# Patient Record
Sex: MALE | Race: WHITE | URBAN - METROPOLITAN AREA
[De-identification: names, ages, dates, MRNs, and addresses within clinical notes are randomized per-mention and may not be internally consistent; named-entity substitution may affect disease eponyms.]

---

## 2019-12-16 PROBLEM — K42.9 UMBILICAL HERNIA WITHOUT OBSTRUCTION AND WITHOUT GANGRENE: Status: ACTIVE | Noted: 2019-12-16

## 2021-01-22 ENCOUNTER — NURSE ONLY (OUTPATIENT)
Dept: PRIMARY CARE CLINIC | Age: 41
End: 2021-01-22

## 2021-01-22 DIAGNOSIS — Z11.1 VISIT FOR MANTOUX TEST: Primary | ICD-10-CM

## 2021-01-22 PROCEDURE — 86580 TB INTRADERMAL TEST: CPT | Performed by: PHYSICIAN ASSISTANT

## 2021-01-24 ENCOUNTER — NURSE ONLY (OUTPATIENT)
Dept: PRIMARY CARE CLINIC | Age: 41
End: 2021-01-24

## 2021-01-24 DIAGNOSIS — Z11.1 ENCOUNTER FOR PPD SKIN TEST READING: Primary | ICD-10-CM

## 2021-01-24 NOTE — PROGRESS NOTES
PPD Reading Note  PPD read and results entered in [employer]. Result: 0 mm induration.   Interpretation: negative  If test not read within 48-72 hours of initial placement, patient advised to repeat in other arm 1-3 weeks after this test.  Allergic reaction: no

## 2021-10-13 ENCOUNTER — HOSPITAL ENCOUNTER (OUTPATIENT)
Age: 41
Setting detail: SPECIMEN
Discharge: HOME | End: 2021-10-13
Payer: OTHER GOVERNMENT

## 2021-10-13 DIAGNOSIS — Z00.00 WELL ADULT EXAM: ICD-10-CM

## 2021-10-13 LAB
ALBUMIN SERPL-MCNC: 4.2 G/DL (ref 3.5–5.2)
ALBUMIN/GLOBULIN RATIO: 1.8 (ref 1–2.5)
ALP BLD-CCNC: 73 U/L (ref 40–129)
ALT SERPL-CCNC: 29 U/L (ref 5–41)
ANION GAP SERPL CALCULATED.3IONS-SCNC: 14 MMOL/L (ref 9–17)
AST SERPL-CCNC: 18 U/L
BILIRUB SERPL-MCNC: 0.48 MG/DL (ref 0.3–1.2)
BUN BLDV-MCNC: 18 MG/DL (ref 6–20)
BUN/CREAT BLD: NORMAL (ref 9–20)
CALCIUM SERPL-MCNC: 9.1 MG/DL (ref 8.6–10.4)
CHLORIDE BLD-SCNC: 105 MMOL/L (ref 98–107)
CHOLESTEROL/HDL RATIO: 5.1
CHOLESTEROL: 194 MG/DL
CO2: 22 MMOL/L (ref 20–31)
CREAT SERPL-MCNC: 0.81 MG/DL (ref 0.7–1.2)
GFR AFRICAN AMERICAN: >60 ML/MIN
GFR NON-AFRICAN AMERICAN: >60 ML/MIN
GFR SERPL CREATININE-BSD FRML MDRD: NORMAL ML/MIN/{1.73_M2}
GFR SERPL CREATININE-BSD FRML MDRD: NORMAL ML/MIN/{1.73_M2}
GLUCOSE FASTING: 89 MG/DL (ref 70–99)
HCT VFR BLD CALC: 45.5 % (ref 40.7–50.3)
HDLC SERPL-MCNC: 38 MG/DL
HEMOGLOBIN: 14.6 G/DL (ref 13–17)
LDL CHOLESTEROL: 123 MG/DL (ref 0–130)
MCH RBC QN AUTO: 27.8 PG (ref 25.2–33.5)
MCHC RBC AUTO-ENTMCNC: 32.1 G/DL (ref 28.4–34.8)
MCV RBC AUTO: 86.5 FL (ref 82.6–102.9)
NRBC AUTOMATED: 0 PER 100 WBC
PDW BLD-RTO: 12.9 % (ref 11.8–14.4)
PLATELET # BLD: 173 K/UL (ref 138–453)
PMV BLD AUTO: 10.3 FL (ref 8.1–13.5)
POTASSIUM SERPL-SCNC: 4.2 MMOL/L (ref 3.7–5.3)
RBC # BLD: 5.26 M/UL (ref 4.21–5.77)
SODIUM BLD-SCNC: 141 MMOL/L (ref 135–144)
TOTAL PROTEIN: 6.5 G/DL (ref 6.4–8.3)
TRIGL SERPL-MCNC: 163 MG/DL
TSH SERPL DL<=0.05 MIU/L-ACNC: 2.77 MIU/L (ref 0.3–5)
VITAMIN B-12: 945 PG/ML (ref 232–1245)
VITAMIN D 25-HYDROXY: 29.6 NG/ML (ref 30–100)
VLDLC SERPL CALC-MCNC: ABNORMAL MG/DL (ref 1–30)
WBC # BLD: 6.6 K/UL (ref 3.5–11.3)

## 2021-10-15 LAB — T4 TOTAL: 8 UG/DL (ref 4.5–10.9)

## 2022-10-27 ENCOUNTER — INITIAL CONSULT (OUTPATIENT)
Dept: PHYSICAL MEDICINE AND REHAB | Age: 42
End: 2022-10-27
Payer: OTHER GOVERNMENT

## 2022-10-27 VITALS
HEART RATE: 94 BPM | DIASTOLIC BLOOD PRESSURE: 99 MMHG | HEIGHT: 63 IN | SYSTOLIC BLOOD PRESSURE: 145 MMHG | BODY MASS INDEX: 37.11 KG/M2 | WEIGHT: 209.44 LBS | TEMPERATURE: 98.8 F

## 2022-10-27 DIAGNOSIS — M25.512 ACUTE PAIN OF LEFT SHOULDER: Primary | ICD-10-CM

## 2022-10-27 DIAGNOSIS — G89.29 CHRONIC PAIN OF SCAPULA: ICD-10-CM

## 2022-10-27 DIAGNOSIS — M25.512 TRIGGER POINT OF LEFT SHOULDER REGION: ICD-10-CM

## 2022-10-27 DIAGNOSIS — M89.8X1 CHRONIC PAIN OF SCAPULA: ICD-10-CM

## 2022-10-27 PROCEDURE — 99243 OFF/OP CNSLTJ NEW/EST LOW 30: CPT | Performed by: PHYSICAL MEDICINE & REHABILITATION

## 2022-10-27 RX ORDER — MELOXICAM 15 MG/1
TABLET ORAL
COMMUNITY
Start: 2022-09-20

## 2022-10-27 RX ORDER — CYCLOBENZAPRINE HCL 10 MG
TABLET ORAL
COMMUNITY
Start: 2022-09-20 | End: 2022-11-14 | Stop reason: DRUGHIGH

## 2022-10-27 NOTE — PROGRESS NOTES
[address] [facility] PHYSICAL MEDICINE AND REHABILITATION  [facility] [place] [address]  Dept: [phone]  Dept Fax: [phone]    New Patient Janette Chavis, 38 y.o., male, is c/o of Shoulder Pain (Left and pain across the mid back) and Neck Pain   and request for evaluation of by Deonte Del Rio MD.    HPI:      HPI  82-year-old right-handed male works for the [employer]. 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 He was poorly off multiple trays and he felt a pop in his mid upper back. He was sent to his PCP. He has had x-rays and MRIs of both the cervical and thoracic spine as well as physical therapy. He has seen Dr. Mati Ngo of neurosurgery. Notes that MRI of the cervical and thoracic spine were normal.  He has had physical therapy and has question some referred pain and shoulder discomfort. As result patient has been referred for further evaluation. He notes weakness on arm extension with any weights. Particularly on the left with discomfort in the mid scapular region greater on the left. He question some burning sensation in the area. Symptoms are better with rest and ice. Physical therapy initially helped however is not sure is making a much more improvement he stopped in mid August.. He notes occasional popping sensation left shoulder as well. He denies any numbness or tingling in the upper extremities. He does note occasional difficulty holding onto things but no nocturnal seizures. He questions possible weakness in the shoulder as well. He has tried meloxicam and Flexeril. Uses Flexeril mostly at nighttime which appears to help. Pain goes from 7-8 out of 10 down to 2-3 out of 10. Per Dr. Mati Ngo reviewed MRI cervical spine does not show any evidence of abnormality that explain his symptoms.   Despite that she looks quite healthy. This is consistent with the findings on thoracic MRI as well.,  He reviewed cervical flexion-extension spine films which did not show any abnormalities. Isreal Alvarez He reviewed shoulder x-ray that was ordered by another provider the left shoulder was read as showing evidence of degenerative changes. X-ray left shoulder 7/28/2022 degenerative changes without acute ossific abnormality    MRI cervical spine 8/9/2022-cervical vertebral body and disc space heights and alignment are not and signal are normal, craniocervical junction is normal, signal in the cervical spinal cord is normal, no disc herniation or spinal stenosis seen, no acute abnormality    No past medical history on file. Past Surgical History:   Procedure Laterality Date    TONSILLECTOMY AND ADENOIDECTOMY         No family history on file. Social History     Tobacco Use    Smoking status: Never    Smokeless tobacco: Never   Substance Use Topics    Alcohol use: Yes        Current Outpatient Medications   Medication Sig Dispense Refill    meloxicam (MOBIC) 15 MG tablet TAKE 1 TABLET BY MOUTH EVERY DAY      cyclobenzaprine (FLEXERIL) 10 MG tablet TAKE 1 TABLET BY MOUTH THREE TIMES DAILY FOR 14 DAYS      montelukast (SINGULAIR) 10 MG tablet TAKE 1 TABLET BY MOUTH DAILY 90 tablet 0    Multiple Vitamins-Minerals (MULTIVITAMIN ADULT PO) Take by mouth      Fexofenadine HCl (ALLEGRA ALLERGY PO) Take by mouth       No current facility-administered medications for this visit. Allergies   Allergen Reactions    Seasonal      Other reaction(s): Burning sensation of nose         Subjective:      Review of Systems  CONSTITUTIONAL: Negativefor fever, chills, sweat, fatigue and unexpected weight change.    HEENT:  Negative for diplopia, blindspots, blurred vision, hearing loss, trouble chewing or swallowing, denies coughingwhile eating or drinking denies nosebleed    RESPIRATORY: Negativefor wheezing, coughing or shortness of breath CARDIOVASCULAR: Negative for chest pain, palpitations,lightheadedness  GASTROINTESTINAL: Negative for heartburn, nausea, constipation,diarrhea, abdominal pain  or incontinence  GENTIOURNIARY: Negative for dysuria, urgency, frequency, incontinence and difficultyurinating. ENDOCRINE: Negative forhot or cold intolerance, denies any significant weight change  MUSCULOSKELETAL:Negative for focal joint pain, back pain, neck pain and arthralgias. NUEROLOGIIC: Negativefor focal weakness, numbness, tingling, balance loss, headaches or falls  BEHAVIOR/PSYCY:Denies depression, anxiety, memory loss or insomnia  SKIN: Denies ulcers, rashesor bruises     Objective:     Physical Exam   BP (!) 145/99   Pulse 94   Temp 98.8 °F (37.1 °C)   Ht 5' 3\" (1.6 m)   Wt 209 lb 7 oz (95 kg)   BMI 37.10 kg/m²     Nursing notes and vitals reviewed    CONSTITUTIONAL: He isoriented to person, place, and time. He appears well-developed and well-nourished. HEENT: Pupils equal reactiveto light, exact motion intact, visual fields are full, no facial asymmetry, speechfluent, no dysarthria, comprehension intact, object naming intact, repetition intact,basic cognition intact  CARDIOVASCULAR:Heart regular rate and rhythm with no murmurs rubs or gallops   PULMONARY/CHEST: Clear to auscultation with no wheezes or crackles noted  ABDOMEN: Soft, nontender withpositive bowel sounds, no guarding or rebound   NEUROLOGIC:    Orientation: Alert and oriented×3,    cranial nerves:  II through XII are intact,   Strength:5 out of 5 throughout bilateral upper and lower extremities including shoulder flexion, extension, abduction, adduction, internal and external rotation. There is question mild tenderness of the lateral shoulder. Drop arm test is negative.   There is no sulcus sign, there is no atrophy there is no scapular winging   Sensation:Intact to light touch and pinprick except questionable decreased left thumb   Balance: Intact, Romberg's is negative   Deep tendon reflexes: Bilaterally equal and symmetric, negative Babinski, negativeHoffmann's   Provocative maneuvers:  EXTREMITIES: No calf tenderness, negative Homans, negative warmth, pulses are intact  SKIN: Skin is warm and dry. PSYCIATIRIC: normal mood and affect, behavior is normal. Thought content normal.  Positive trigger point left trapezius and left mid thoracic region which reproduces his pain on palpation with radiation into the scapula and shoulder region. Assessment:       Diagnosis Orders   1. Acute pain of left shoulder  CBC    Comprehensive Metabolic Panel      2. Trigger point of left shoulder region  CBC    Comprehensive Metabolic Panel      3. Chronic pain of scapula               Plan:      29-year-old male working in [employer] has discomfort at mid left thoracic area with radiation into the mid scapular and left shoulder after an injury while moving trays. Also notes some clicking in the left shoulder. He has seen Dr. Nora Anderson of neurosurgery-cervical and thoracic MRI is unremarkable, flexion-extension cervical spine unremarkable per Dr. Nora Anderson. X-ray of the left shoulder show some degenerative changes. 1.  Examination notes positive trigger points left upper trapezius and mid thoracic which reproduces symptoms. Would like to do trigger point injections as I suspect this is more musculoskeletal and these trigger points reproduces his symptoms. He notes we need to get approval.  After trigger point injections will follow up with stretching, massage, modalities. , etc with physical therapy  2. He continues on meloxicam and Flexeril-we will get lab work CBC and CMP to make sure he tolerated this well. Noted blood pressure has been mildly elevated. He is to follow with his PCP  3. If symptoms persist despite trigger point injections and therapy,  will then consider EMG left upper extremity and MRI of the left shoulder  4.   Above was discussed at length with patient -he is agreement with the plan. Orders Placed This Encounter   Procedures    CBC     Standing Status:   Future     Standing Expiration Date:   10/27/2023    Comprehensive Metabolic Panel     Standing Status:   Future     Standing Expiration Date:   10/27/2023     No orders of the defined types were placed in this encounter. No follow-ups on file. Electronically signedby Radha Carlos. Troy Holloway MD on 10/27/2022 at 5:46 PM    Please note that this chart was generated using voicerecognition Dragon dictation software. Although every effort was made to ensurethe accuracy of this automated transcription, some errors in transcription may haveoccurred.

## 2022-10-27 NOTE — LETTER
[address] [facility] PHYSICAL MEDICINE AND REHABILITATION  [address] [place] [address]  Dept: [phone]  Dept Fax: [phone]      10/27/22    Patient: Esther Leija  YOB: 1980    Dear  Ean Wilkinson MD ,    I had the pleasure of seeing your patient, Esther Leija today in the office today. Below are the relevant portions of my assessment and plan of care. IMPRESSION:   Diagnosis Orders   1. Acute pain of left shoulder  CBC    Comprehensive Metabolic Panel      2. Trigger point of left shoulder region  CBC    Comprehensive Metabolic Panel      3. 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 Also notes some clicking in the left shoulder. He has seen Dr. Cheyanne Mckinney of neurosurgery-cervical and thoracic MRI is unremarkable, flexion-extension cervical spine unremarkable per Dr. Cheyanne Mckinney. X-ray of the left shoulder show some degenerative changes. 1.  Examination notes positive trigger points left upper trapezius and mid thoracic which reproduces symptoms. Would like to do trigger point injections as I suspect this is more musculoskeletal and these trigger points reproduces his symptoms. He notes we need to get approval.  After trigger point injections will follow up with stretching, massage, modalities. , etc with physical therapy  2. He continues on meloxicam and Flexeril-we will get lab work CBC and CMP to make sure he tolerated this well. Noted blood pressure has been mildly elevated. He is to follow with his PCP  3. If symptoms persist despite trigger point injections and therapy,  will then consider EMG left upper extremity and MRI of the left shoulder  4. Above was discussed at length with patient -he is agreement with the plan.          Orders Placed This Encounter   Procedures    CBC     Standing Status:   Future     Standing Expiration Date:   10/27/2023    Comprehensive Metabolic Panel     Standing Status:   Future     Standing Expiration Date:   10/27/2023     No orders of the defined types were placed in this encounter. No follow-ups on file. Thank you for allowing me to participate in the care of this patient. I will keep you updated on this patient's follow up and I look forward to serving you and your patients again in the future. Dimas Phalen. Namrata Kumar MD

## 2022-11-14 ENCOUNTER — HOSPITAL ENCOUNTER (OUTPATIENT)
Age: 42
Discharge: HOME | End: 2022-11-14
Payer: OTHER GOVERNMENT

## 2022-11-14 ENCOUNTER — PROCEDURE VISIT (OUTPATIENT)
Dept: PHYSICAL MEDICINE AND REHAB | Age: 42
End: 2022-11-14
Payer: OTHER GOVERNMENT

## 2022-11-14 ENCOUNTER — TELEPHONE (OUTPATIENT)
Dept: PHYSICAL MEDICINE AND REHAB | Age: 42
End: 2022-11-14

## 2022-11-14 VITALS
WEIGHT: 213 LBS | HEART RATE: 81 BPM | DIASTOLIC BLOOD PRESSURE: 98 MMHG | SYSTOLIC BLOOD PRESSURE: 141 MMHG | BODY MASS INDEX: 37.73 KG/M2 | TEMPERATURE: 97.4 F

## 2022-11-14 DIAGNOSIS — M25.512 TRIGGER POINT OF LEFT SHOULDER REGION: ICD-10-CM

## 2022-11-14 DIAGNOSIS — M79.18 MYOFASCIAL PAIN ON LEFT SIDE: ICD-10-CM

## 2022-11-14 DIAGNOSIS — M25.512 ACUTE PAIN OF LEFT SHOULDER: ICD-10-CM

## 2022-11-14 DIAGNOSIS — G89.29 CHRONIC PAIN OF SCAPULA: ICD-10-CM

## 2022-11-14 DIAGNOSIS — M89.8X1 CHRONIC PAIN OF SCAPULA: ICD-10-CM

## 2022-11-14 DIAGNOSIS — M62.838 SPASM OF MUSCLE: Primary | ICD-10-CM

## 2022-11-14 LAB
ALBUMIN SERPL-MCNC: 4.5 G/DL (ref 3.5–5.2)
ALP BLD-CCNC: 85 U/L (ref 40–129)
ALT SERPL-CCNC: 41 U/L (ref 5–41)
ANION GAP SERPL CALCULATED.3IONS-SCNC: 7 MMOL/L (ref 9–17)
AST SERPL-CCNC: 22 U/L
BILIRUB SERPL-MCNC: 0.6 MG/DL (ref 0.3–1.2)
BUN BLDV-MCNC: 13 MG/DL (ref 6–20)
CALCIUM SERPL-MCNC: 9.5 MG/DL (ref 8.6–10.4)
CHLORIDE BLD-SCNC: 105 MMOL/L (ref 98–107)
CO2: 28 MMOL/L (ref 20–31)
CREAT SERPL-MCNC: 0.79 MG/DL (ref 0.7–1.2)
GFR SERPL CREATININE-BSD FRML MDRD: >60 ML/MIN/1.73M2
GLUCOSE BLD-MCNC: 91 MG/DL (ref 70–99)
HCT VFR BLD CALC: 42.5 % (ref 41–53)
HEMOGLOBIN: 14.6 G/DL (ref 13.5–17.5)
MCH RBC QN AUTO: 28 PG (ref 26–34)
MCHC RBC AUTO-ENTMCNC: 34.3 G/DL (ref 31–37)
MCV RBC AUTO: 81.6 FL (ref 80–100)
PDW BLD-RTO: 13.4 % (ref 11.5–14.9)
PLATELET # BLD: 181 K/UL (ref 150–450)
PMV BLD AUTO: 7.2 FL (ref 6–12)
POTASSIUM SERPL-SCNC: 4.2 MMOL/L (ref 3.7–5.3)
RBC # BLD: 5.21 M/UL (ref 4.5–5.9)
SODIUM BLD-SCNC: 140 MMOL/L (ref 135–144)
TOTAL PROTEIN: 7 G/DL (ref 6.4–8.3)
WBC # BLD: 6.7 K/UL (ref 3.5–11)

## 2022-11-14 PROCEDURE — 80053 COMPREHEN METABOLIC PANEL: CPT

## 2022-11-14 PROCEDURE — 36415 COLL VENOUS BLD VENIPUNCTURE: CPT

## 2022-11-14 PROCEDURE — 85027 COMPLETE CBC AUTOMATED: CPT

## 2022-11-14 PROCEDURE — 20552 NJX 1/MLT TRIGGER POINT 1/2: CPT | Performed by: PHYSICAL MEDICINE & REHABILITATION

## 2022-11-14 RX ORDER — LIDOCAINE HYDROCHLORIDE 10 MG/ML
1 INJECTION, SOLUTION INFILTRATION; PERINEURAL ONCE
Status: COMPLETED | OUTPATIENT
Start: 2022-11-14 | End: 2022-11-14

## 2022-11-14 RX ORDER — CYCLOBENZAPRINE HCL 5 MG
5 TABLET ORAL 3 TIMES DAILY
Qty: 90 TABLET | Refills: 0 | Status: CANCELLED | OUTPATIENT
Start: 2022-11-14 | End: 2022-12-14

## 2022-11-14 RX ORDER — CYCLOBENZAPRINE HCL 5 MG
5 TABLET ORAL 2 TIMES DAILY PRN
Qty: 60 TABLET | Refills: 0 | Status: SHIPPED | OUTPATIENT
Start: 2022-11-14 | End: 2022-12-14

## 2022-11-14 RX ADMIN — LIDOCAINE HYDROCHLORIDE 1 ML: 10 INJECTION, SOLUTION INFILTRATION; PERINEURAL at 10:37

## 2022-11-14 NOTE — TELEPHONE ENCOUNTER
Murguia Halina called and said he talked to the pharmacist about cutting the Flexeril 10 mg that he has at home in half. The pharmacist said it is not recommended. Blade Meade is asking for an rx to be sent to his pharmacy for the 5 mg dose you suggested at the visit today. FYI: I will send in form for auth for the physical therapy you ordered. He wants to go to [facility] since it is closer to home. Will send that referral and the approval once I get it.

## 2022-11-14 NOTE — PROGRESS NOTES
[address] [facility] PHYSICAL MEDICINE AND REHABILITATION  [address] [place] [id number]  Dept: [phone]  Dept Fax: [phone]    New Patient Valencia Reynolds, 38 y.o., male, is c/o of Injections (TPI for the cervical/traps)   and request for evaluation of by Myriam Jacinto MD.    HPI:      HPI    Since last visit he has been doing fairly well however continues with ups and downs regards to the left posterior shoulder and scapular discomfort. Notes some discomfort on deep breathing at times. He has not obtained his lab work he plans to get that today. He takes Flexeril 10 mg occasionally at nighttime. He is noted to have mildly high blood pressure he is following up with his PCP. He returns today for trigger point injections after approval.  Denies any significant change in his pain pattern. 6/22/2022 MRI thoracic spine  IMPRESSION:  Normal plain MRI examination of the thoracic spine. 4/19/2022 x-ray thoracic spine\  IMPRESSION:     No acute osseous abnormality of the thoracic spine. 7/27/2022 x-ray cervical spine  IMPRESSION:   1. Normal alignment of the cervical spine without subluxation on the flexion and extension views. History  65-year-old right-handed male works for the [employer]. 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 He was poorly off multiple trays and he felt a pop in his mid upper back. He was sent to his PCP. He has had x-rays and MRIs of both the cervical and thoracic spine as well as physical therapy. He has seen Dr. Quincy Hunter of neurosurgery. Notes that MRI of the cervical and thoracic spine were normal.  He has had physical therapy and has question some referred pain and shoulder discomfort. As result patient has been referred for further evaluation.     He notes weakness on arm extension with any weights. Particularly on the left with discomfort in the mid scapular region greater on the left. He question some burning sensation in the area. Symptoms are better with rest and ice. Physical therapy initially helped however is not sure is making a much more improvement he stopped in mid August.. He notes occasional popping sensation left shoulder as well. He denies any numbness or tingling in the upper extremities. He does note occasional difficulty holding onto things but no nocturnal seizures. He questions possible weakness in the shoulder as well. He has tried meloxicam and Flexeril. Uses Flexeril mostly at nighttime which appears to help. Pain goes from 7-8 out of 10 down to 2-3 out of 10. Per Dr. Magdalena Velasquez reviewed MRI cervical spine does not show any evidence of abnormality that explain his symptoms. Despite that she looks quite healthy. This is consistent with the findings on thoracic MRI as well.,  He reviewed cervical flexion-extension spine films which did not show any abnormalities. Moe Goetz He reviewed shoulder x-ray that was ordered by another provider the left shoulder was read as showing evidence of degenerative changes. X-ray left shoulder 7/28/2022 degenerative changes without acute ossific abnormality    MRI cervical spine 8/9/2022-cervical vertebral body and disc space heights and alignment are not and signal are normal, craniocervical junction is normal, signal in the cervical spinal cord is normal, no disc herniation or spinal stenosis seen, no acute abnormality    History reviewed. No pertinent past medical history. Past Surgical History:   Procedure Laterality Date    TONSILLECTOMY AND ADENOIDECTOMY         No family history on file.     Social History     Tobacco Use    Smoking status: Never    Smokeless tobacco: Never   Substance Use Topics    Alcohol use: Yes        Current Outpatient Medications   Medication Sig Dispense Refill    meloxicam (MOBIC) 15 MG tablet TAKE 1 TABLET BY MOUTH EVERY DAY      cyclobenzaprine (FLEXERIL) 10 MG tablet TAKE 1 TABLET BY MOUTH THREE TIMES DAILY FOR 14 DAYS      montelukast (SINGULAIR) 10 MG tablet TAKE 1 TABLET BY MOUTH DAILY 90 tablet 0    Multiple Vitamins-Minerals (MULTIVITAMIN ADULT PO) Take by mouth      Fexofenadine HCl (ALLEGRA ALLERGY PO) Take by mouth       No current facility-administered medications for this visit. Allergies   Allergen Reactions    Grass Extracts [Gramineae Pollens]      Other reaction(s): Burning sensation of nose    Seasonal      Other reaction(s): Burning sensation of nose         Subjective:      Review of Systems  CONSTITUTIONAL: Negativefor fever, chills, sweat, fatigue and unexpected weight change. HEENT:  Negative for diplopia, blindspots, blurred vision, hearing loss, trouble chewing or swallowing, denies coughingwhile eating or drinking denies nosebleed    RESPIRATORY: Negativefor wheezing, coughing or shortness of breath    CARDIOVASCULAR: Negative for chest pain, palpitations,lightheadedness  GASTROINTESTINAL: Negative for heartburn, nausea, constipation,diarrhea, abdominal pain  or incontinence  GENTIOURNIARY: Negative for dysuria, urgency, frequency, incontinence and difficultyurinating. ENDOCRINE: Negative forhot or cold intolerance, denies any significant weight change  MUSCULOSKELETAL:Negative for focal joint pain, back pain, neck pain and arthralgias. NUEROLOGIIC: Negativefor focal weakness?, numbness, tingling, balance loss, headaches or falls  BEHAVIOR/PSYCY:Denies depression, anxiety, memory loss or insomnia  SKIN: Denies ulcers, rashesor bruises     Objective:     Physical Exam   BP (!) 141/98   Pulse 81   Temp 97.4 °F (36.3 °C)   Wt 213 lb (96.6 kg)   BMI 37.73 kg/m²     Nursing notes and vitals reviewed    CONSTITUTIONAL: He is oriented to person, place, and time. He appears well-developed and well-nourished.   HEENT: Pupils equal reactiveto light, exact motion intact, visual fields are full, no facial asymmetry, speechfluent, no dysarthria, comprehension intact, object naming intact, repetition intact,basic cognition intact  CARDIOVASCULAR:Heart regular rate and rhythm with no murmurs rubs or gallops   PULMONARY/CHEST: Clear to auscultation with no wheezes or crackles noted  ABDOMEN: Soft, nontender with positive bowel sounds, no guarding or rebound   NEUROLOGIC:    Orientation: Alert and oriented×3,    cranial nerves:  II through XII are intact,   Strength:5 out of 5 throughout bilateral upper and lower extremities including shoulder flexion, extension, abduction, adduction, internal and external rotation. There is question mild tenderness of the lateral shoulder. Drop arm test is negative. There is no sulcus sign, there is no atrophy there is no scapular winging   Sensation:Intact to light touch and pinprick except questionable decreased left thumb   Balance: Intact, Romberg's is negative   Deep tendon reflexes: Bilaterally equal and symmetric, negative Babinski, negativeHoffmann's   Provocative maneuvers:  EXTREMITIES: No calf tenderness, negative Homans, negative warmth, pulses are intact  SKIN: Skin is warm and dry. PSYCIATIRIC: normal mood and affect, behavior is normal. Thought content normal.  Positive trigger point left trapezius and left mid thoracic region which reproduces his pain on palpation with radiation into the scapula and shoulder region again noted    Assessment:       Diagnosis Orders   1. Spasm of muscle  [facility]    lidocaine 1 % injection 1 mL      2. Myofascial pain on left side  [facility]      3. Trigger point of left shoulder region  [address]      4. Chronic pain of scapula               Plan:      28-year-old male working in [employer] has discomfort at mid left thoracic area with radiation into the mid scapular and left shoulder after an injury while moving trays.   Also notes some clicking in the left shoulder. He has seen Dr. Luster Seip of neurosurgery-cervical and thoracic MRI is unremarkable, flexion-extension cervical spine unremarkable per Dr. Luster Seip. X-ray of the left shoulder show some degenerative changes. 1.  Examination notes positive trigger points left upper trapezius and mid thoracic which reproduces symptoms. 2 trigger points were injected today-he will follow-up with physical therapy post procedure-prescription given    Trigger Point Procedural Note    Indication: pain control    Indications and potential risks and complications of the procedure were explained to the patient. Patient was informed that there would be some pain associated with the procedure, and that the injection might not relieve the symptoms. In addition, possible side effects, including increased pain, infection, pneumothorax or bleeding could result from the injection. Patient also told that there are limitations on the frequency and amount of injections in any joint. Questions were answered and the patient agreed to the procedure. Informed consent signed per patient. Procedure: The patient was placed in the appropriate position and the area over the trigger point was prepped with alcohol and Betadine- injection was performed into the trigger point-2 trigger points-left upper trapezius and left mid thoracic area using 0.5 ml each of Lidocaine 1%. The injection site was covered with a bandage. Complications: None, patient tolerated procedure well. He noted improvement after the procedure with decreased pain and decreased radiation. 2.  He continues on meloxicam and Flexeril-noted mildly elevated blood pressure-advised to discontinue meloxicam and discuss with PCP. He notes Flexeril 10 mg at nighttime helps, we discussed consideration of possible 5 mg during the day and 10 mg at hs or 5 mg twice daily for approximately 10 days. Will order if lab work is okay-CBC/CMP from today was okay. Will order Flexeril 5 mg twice daily-will take scheduled for 10 days and then as needed    3. If symptoms persist despite trigger point injections and therapy,  will then consider EMG left upper extremity and MRI of the left shoulder, though he does not have really any numbness or tingling going in the left upper extremity,-suspect this is more musculoskeletal-MRI of the cervical thoracic spine noted and reviewed by NS    4. Above was discussed with patient -he is agreement with the plan. Orders Placed This Encounter   Procedures    [address]     Referral Priority:   Routine     Referral Type:   Eval and Treat     Referral Reason:   Specialty Services Required     Requested Specialty:   Physical Therapist     Number of Visits Requested:   1     Orders Placed This Encounter   Medications    lidocaine 1 % injection 1 mL         Return in about 4 weeks (around 12/12/2022). Electronically signedby Payal Etienne MD on 11/14/2022 at 2:57 PM    Please note that this chart was generated using voicerecognition Dragon dictation software. Although every effort was made to ensurethe accuracy of this automated transcription, some errors in transcription may haveoccurred.

## 2022-11-14 NOTE — LETTER
[address] [place] [facility]  [address] [place] [address]  [place] [address]  Dept: [phone]  Dept Fax: [phone]      11/14/22    Patient: Carlos Wade  YOB: 1980    Dear  Heavenly Mcdonnell MD ,    I had the pleasure of seeing your patient, Carlos Wade today in the office today. Below are the relevant portions of my assessment and plan of care. Diagnosis Orders   1. Spasm of muscle  [facility] - [facility]    lidocaine 1 % injection 1 mL      2. Myofascial pain on left side  [facility] - [facility]      3. Trigger point of left shoulder region  [address]      4. 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 Also notes some clicking in the left shoulder. He has seen Dr. Drea Gamez of neurosurgery-cervical and thoracic MRI is unremarkable, flexion-extension cervical spine unremarkable per Dr. Drea Gamez. X-ray of the left shoulder show some degenerative changes. 1.  Examination notes positive trigger points left upper trapezius and mid thoracic which reproduces symptoms. 2 trigger points were injected today-he will follow-up with physical therapy post procedure-prescription given    Trigger Point Procedural Note    Indication: pain control    Indications and potential risks and complications of the procedure were explained to the patient. Patient was informed that there would be some pain associated with the procedure, and that the injection might not relieve the symptoms. In addition, possible side effects, including increased pain, infection, pneumothorax or bleeding could result from the injection.   Patient also told that there are limitations on the frequency and amount of injections in any joint.  Questions were answered and the patient agreed to the procedure. Informed consent signed per patient. Procedure: The patient was placed in the appropriate position and the area over the trigger point was prepped with alcohol and Betadine- injection was performed into the trigger point-2 trigger points-left upper trapezius and left mid thoracic area using 0.5 ml each of Lidocaine 1%. The injection site was covered with a bandage. Complications: None, patient tolerated procedure well. He noted improvement after the procedure with decreased pain and decreased radiation. 2.  He continues on meloxicam and Flexeril-noted mildly elevated blood pressure-advised to discontinue meloxicam and discuss with PCP. He notes Flexeril 10 mg at nighttime helps, we discussed consideration of possible 5 mg during the day and/or 5 mg twice daily for approximately 10 days. We will order if lab work is okay. He plans to get his lab work today    3. If symptoms persist despite trigger point injections and therapy,  will then consider EMG left upper extremity and MRI of the left shoulder, though he does not have really any numbness or tingling going in the left upper extremity,-suspect this is more musculoskeletal-MRI of the cervical thoracic spine noted and reviewed by NS    4. Above was discussed with patient -he is agreement with the plan. Orders Placed This Encounter   Procedures    [facility]     Referral Priority:   Routine     Referral Type:   Eval and Treat     Referral Reason:   Specialty Services Required     Requested Specialty:   Physical Therapist     Number of Visits Requested:   1     Orders Placed This Encounter   Medications    lidocaine 1 % injection 1 mL       Thank you for allowing me to participate in the care of this patient. I will keep you updated on this patient's follow up and I look forward to serving you and your patients again in the future.     Rush Coleman MD [place]

## 2022-12-08 ENCOUNTER — HOSPITAL ENCOUNTER (OUTPATIENT)
Dept: PHYSICAL THERAPY | Facility: CLINIC | Age: 42
Setting detail: THERAPIES SERIES
Discharge: HOME | End: 2022-12-08
Payer: OTHER GOVERNMENT

## 2022-12-08 PROCEDURE — 97162 PT EVAL MOD COMPLEX 30 MIN: CPT

## 2022-12-08 PROCEDURE — 97124 MASSAGE THERAPY: CPT

## 2022-12-08 PROCEDURE — 97140 MANUAL THERAPY 1/> REGIONS: CPT

## 2022-12-08 NOTE — CONSULTS
[] [facility]) - [facility] &  Therapy  [address]  [phone]  F: [phone] [x] [address]  [facility]   [address]  P: [phone]  F: [phone] [] [place]  [address]  P: [phone]  F: [phone] [] [address]  P: [phone]  F: [phone] [] [address]  [facility]   [address]   [place]: [phone]  F: [phone]      Physical Therapy Upper Extremity Evaluation    Date:  2022  Patient: Jaun Clark    : 1980  MRN: [id number]  Physician: Chepe Saini MD   Insurance: [email or web address] (16 visits approved through 2023, goes through a [employer] comp division of  - [employer] of duty care\")  Medical Diagnosis: spasms of muscle, myofascial pain on left side, trigger point of left shoulder region  Left mid scapular pain, left shoulder pain    Rehab Codes: M54.6, M62.830, M79.1  Onset Date:  ~ January 15, 2022     Next 's appt: 2022    Subjective:   CC: Pain in shoulders. Had 2 injections on the left side (unable to find any on the right day of injections)  Has had some relief on the left side. Small motions like doing dishes , increased pain. Lays down due to the pain. Strength not an issue but small motions, holding a tray in cafeteria, can feel it in his midback. Pain worse with small motions in front of him. No use of heat lately, has done some cold packs - temporary relief. Was taking flexeril (pm) 10 mg, trying 5 mg 2x a day- may have helped a little bit. Difficulty rotating spine to the right. Increased pain in thoracic region with cervical side bending right to left.    \"Popping\" in left shoulder with active flexion and abduction (not on right)  No previous injuries. Coming to PT here - closer to home, was driving 35 min to previous location. 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 Minimal pain at the time, worsening over the next few days. They referred him to PCP, X rays of back, [facility]  Was not progressing with therapy, minimal relief. MRI of thoracic and cervical spine. Both \"fine\" Referred to neuro, Dr. Martin Lam. He thought spine was okay, felt it was a pain being referred from somewhere. Saw PCP again, seemed more in the shoulders with exercises. X rayed left shoulder, only thing \"not normal\"  Saw Dr. Holley Bess for shoulders. Seen him 2x now. Dr. Holley Bess did lidocaine injections in \"knotty spots\"  has helped a little bit, also referred to PT. To get some more dedicated PT for shoulders. PMHx: [x] Unremarkable [] Diabetes [] HTN  [] Pacemaker   [] MI/Heart Problems [] Cancer [] Arthritis [] Other:              [] Refer to full medical chart  In EPIC       Prior PT consisted of \"trial by error\"  Pain with prone scapular strengthening \"Y\" in particular, rows  Some exercises - strengthening bar work, no modalities, mild shoulder massage      Comorbidities:   [] Obesity [] Dialysis  [] N/A   [] Asthma/COPD [] Dementia [] Other:   [] Stroke [] Sleep apnea [] Other:   [] Vascular disease [] Rheumatic disease [] Other:     Tests:   6/22/2022 MRI thoracic spine  IMPRESSION:  Normal plain MRI examination of the thoracic spine. 4/19/2022 x-ray thoracic spine\  IMPRESSION:     No acute osseous abnormality of the thoracic spine. 7/27/2022 x-ray cervical spine  IMPRESSION:   1. Normal alignment of the cervical spine without subluxation on the flexion and extension views.         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, normal running, sit ups, push ups       Gait Prior level of function Current level of function    [x] Independent  [] Assist [x] Independent  [] Assist   Device: [x] Independent [x] Independent     Pain:  [x] Yes  [] No Location: thoracic paraspinals and scapular, upper traps, left medial/inferior scapular border   Pain Rating: (0-10 scale) 2-3/10  Pain altered Tx:  [] Yes  [x] No  Action:    Symptoms:  [] Improving [] Worsening [x] Same  Better:      [x] Lying on back    [] Other:  Worse:     [x] Other:daily tasks  Sleep: [] OK    [x] Disturbed- depends on the day, laying down helps, some days lays down and has pain    Objective:     ROM  °A/P END FEEL STRENGTH TESTS (+/-) Left Right Not Tested    Left Right  Left Right Drop Arm - - []   Sit Shld Flex wnl wnl  5 5 Sulcus Sign   []   Sit Shld Abd wnl wnl  5 5 Apprehension - - []   Sit Shld IR wnl wnl    Yergasons   []   Shoulder Flex      Speeds - - []   Ext      Neer   []   ABD      Guerrero    []   ER @ 0 75 82  4 5 Painful Arc - - []   IR      Tinel   []   Supraspinatus    5 5       Infraspinatus    4 5       Mid Trap    4 ,  Pain + 4+       Lower Trap             L3    92# 101 #        Cervical AROM , = WNL  Increased pain across thoracic region after MMT of arms    OBSERVATION No Deficit Deficit Not Tested Comments   Forward Head [] [] []    Rounded Shoulders [] [x] [] Kyphosis [] [x] []    Scapular Height/Position [] [] []    Winging [] [] []    Scapulohumeral Rhythm [] [] []    INSPECTION/PALPATION       SC/AC Joint [x] [] []    Supraspinatus [x] [] []    Biceps tendon/groove [x] [] []    Posterior shld [] [x] []    Subscapularis [x] [] []    NEUROLOGICAL       Cervical ROM/Quadrant [] [x] [] Decreased rotation to the left   Reflexes [] [] [x]    Compression/Distraction [] [] [x]    Sensation [] [x] [] Intermittent reports of burning, tingling in lower thoracic region T6-T9 region     Functional Test: UEFS Score: 57/80, 28% functionally impaired     Comments:    Assessment:  Patient would benefit from skilled physical therapy services in order to: decrease pain and tightness in thoracic spine and upper traps that are limiting simple ADL's at home. To include manual work, Myofasical release and gentle strengthening to work on mobility of thoracic spine and scapula and progress to strengthening to promote scapular retraction and spinal extension in thoracic spine. Problem list, as detailed above:   [x] ? Pain left thoracic, scapular region > right    [x] ? ROM decreased thoracic spinal mobility    [x] ? Strength In left shoulder and shoulder girdle. [x] ? Function: Increased pain in thoracic region with simple tasks with arms stretched forward, washing dishes, etc  [x] Postural Deviations- increased kyphosis with forward head  [x] Other trigger point palpated left inferior medial scapula, left upper trap (to a lesser degree right upper trap)    Pain appears to be originating more in thoracic spine and left periscapular region than in shoulder joint itself. STG: (to be met in 8 treatments)  ? Pain:from 3/10 to 1/10 with completing light home tasks  ? ROM: Demonstrate sitting and actively extending thoracic spine without increased pain for demonstrate improved mobility of thoracic spine  ?  Strength: Initiate prone scapular strengthening to improve upright posturing (decrease IR of shoulders)  ? Function: Able to hold 3# (coffee cup, groceries) in front of him for 20 seconds without increased pain in scapula/thoracic spine. Patient to be independent with home exercise program as demonstrated by performance with correct form without cues. LTG: (to be met in 16 treatments)  Able to complete home cleaning, laundry, vacuuming with little to no difficulty. Able to reach forward holding up to 10# to put groceries away, use tools at home  Strength in left scapula = to right to decrease strain and pain in thoracic paraspinals  No reports of stabbing pain or pins and needles in thoracic spine and scapular to allow him to maintain active lifestyle including working for [employer]                   Patient goals:  be pain free    Rehab Potential:  [x] Good  [] Fair  [] Poor   Suggested Professional Referral:  [x] No  [] Yes:  Barriers to Goal Achievement:  [x] No  [] Yes:  Domestic Concerns:  [x] No  [] Yes:    Pt. Education:  [x] Plans/Goals, Risks/Benefits discussed  [x] Home exercise program  Method of Education: [x] Verbal  [x] Demo  [x] Written  Access Code: [email or web address]  URL: [employer]/  Date: 12/08/2022  Prepared by: Katerine Sis    Exercises  Seated Trunk Rotation - Arms Crossed - 2 x daily - 7 x weekly - 1 sets - 10 reps - 10 hold  Seated Thoracic Extension and Rotation with Reach - 2 x daily - 7 x weekly - 1 sets - 10 reps - 5 hold      Comprehension of Education:  [] Verbalizes understanding. [] Demonstrates understanding. [x] Needs Review. [] Demonstrates/verbalizes understanding of HEP/Ed previously given.     Treatment Plan:  [x] Therapeutic Exercise   31381  [] Iontophoresis: 4 mg/mL Dexamethasone Sodium Phosphate  mAmin  64276   [] Therapeutic Activity  13948 [] Vasopneumatic cold with compression  96370    [] Gait Training   54878 [] Ultrasound   93933   [] Neuromuscular Re-education  99017 [] Electrical Stimulation Unattended  74083   [x] Manual Therapy  94730 [] Electrical Stimulation Attended  [id number]   [x] Instruction in HEP  [] Lumbar/Cervical Traction  [id number]   [] Aquatic Therapy   [id number] [x] Cold/hotpack    [x] Massage   90730      [] Dry Needling, 1 or 2 muscles  49317   [] Biofeedback, first 15 minutes   37133  [] Biofeedback, additional 15 minutes   16053 [x] Dry Needling, 3 or more muscles  30946     [x]  Medication allergies reviewed for use of    Dexamethasone Sodium Phosphate 4mg/ml     with iontophoresis treatments. Pt is not allergic. Frequency: 2 x/week for 16 visits    Todays Treatment:  Modalities:    Prone on table, hypervolt with flexible round tip, T5-T10 paraspinal region, left  Massage to thoracic region, trigger point release to areas of palpable tightness.   (Left T6-T9 region, left upper trap, right upper traP) Slow MFR to lats, upper traps, spinal extensors  Precautions: blood pressure - running a little high 140/90's at last physician visit, now holding meloxicam   AA-ROM, mild PRE, massage, stretching, HEP per script  Exercises:  Exercise Reps/ Time Weight/ Level Comments   Sitting thoracic rotation 10x 10sec Hands crossed in front   Sitting arm flexion overhead with thoracic extension 10x 5 sed             Next below   Upper trap stretching 3 positions      Doorway stretch         Consider supine on 1/2 foam roll with stabilization exercises   Prone scapular                  Other:  May try kinesiotaping  Specific Instructions for next treatment:  Monitor response to above  Prone strengthening      Evaluation Complexity:  History (Personal factors, comorbidities) [] 0 [x] 1-2 [] 3+   Exam (limitations, restrictions) [] 1-2 [x] 3 [] 4+   Clinical presentation (progression) [] Stable [x] Evolving  [] Unstable   Decision Making [] Low [x] Moderate [] High    [] Low Complexity [x] Moderate Complexity [] High Complexity       Treatment Charges: Mins Units   [x] Evaluation       []  Low       [x] Moderate       []  High 30 1   [x]  Modalities- msg 10 1   []  Ther Exercise     [x]  Manual Therapy- MF, hypervolt 15 1   []  Ther Activities     []  Aquatics     []  Vasocompression     []  Other       TOTAL TREATMENT TIME: 55 min    Time in: 5:00 pm    Time Out: 5:58 pm    Electronically signed by: Cathy Clayton PT

## 2022-12-12 ENCOUNTER — OFFICE VISIT (OUTPATIENT)
Dept: PHYSICAL MEDICINE AND REHAB | Age: 42
End: 2022-12-12
Payer: OTHER GOVERNMENT

## 2022-12-12 VITALS
BODY MASS INDEX: 37.55 KG/M2 | DIASTOLIC BLOOD PRESSURE: 110 MMHG | HEART RATE: 86 BPM | TEMPERATURE: 97.6 F | WEIGHT: 212 LBS | SYSTOLIC BLOOD PRESSURE: 152 MMHG

## 2022-12-12 DIAGNOSIS — M25.512 LEFT SHOULDER PAIN, UNSPECIFIED CHRONICITY: Primary | ICD-10-CM

## 2022-12-12 PROCEDURE — 99213 OFFICE O/P EST LOW 20 MIN: CPT | Performed by: PHYSICAL MEDICINE & REHABILITATION

## 2022-12-12 NOTE — LETTER
[address] [facility]  [address] [facility]  [place] [address]  Dept: [phone]  Dept Fax: [phone]      12/12/22    Patient: Mayito Wheeler  YOB: 1980    Dear  Bart Sunshine MD ,    I had the pleasure of seeing your patient, Mayito Wheeler today in the office today. Below are the relevant portions of my assessment and plan of care. IMPRESSION:   Diagnosis Orders   1. 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 Also notes some clicking in the left shoulder. He has seen Dr. Cat Wolf of neurosurgery-per Dr Cat Wolf cervical and thoracic MRI is unremarkable, flexion-extension cervical spine unremarkable per Dr. Cat Wolf. X-ray of the left shoulder show some degenerative changes. 1.  Examination notes positive trigger points left upper trapezius and mid thoracic which reproduces symptoms. Had trigger point injections at last visit with some improvement for a few days. Her symptoms have returned. 2.  He continues on  Flexeril 5 mg twice daily as needed-not using as much, he is no longer using anti-inflammatories noted mildly elevated blood pressure-again advised discuss with PCP. CBC and CMP from 11/14/2022 reviewed, also ESR C-reactive protein from July 2022 normal    3.   He has had a course of physical therapy, trigger points, he is again trying physical therapy more focused treatment-if symptoms persist after few weeks of therapy will check MRI of the left shoulder-left scapular area, does not want to pursue EMG at this time though he does not have really any numbness or tingling going in the left upper extremity,-suspect this is more musculoskeletal-MRI of the cervical thoracic spine noted and reviewed by NS. Depending on progress/MRI etc. may need referral to orthopedic     4. Above was discussed with patient -he is in agreement with the plan. Follow-up approximately 4 to 6 weeks         Orders Placed This Encounter   Procedures    MRI SHOULDER LEFT WO CONTRAST     Standing Status:   Future     Standing Expiration Date:   12/13/2023     Scheduling Instructions:      Please include Left posterior shoulder and scapula     Order Specific Question:   Reason for exam:     Answer:   pain in Left  shoulder and posterior shoulder / scapula    ALT     Standing Status:   Future     Standing Expiration Date:   12/12/2023    AST     Standing Status:   Future     Standing Expiration Date:   12/13/2023     No orders of the defined types were placed in this encounter. No follow-ups on file. Thank you for allowing me to participate in the care of this patient. I will keep you updated on this patient's follow up and I look forward to serving you and your patients again in the future. Lien Bess MD

## 2022-12-12 NOTE — PROGRESS NOTES
[address] [facility] PHYSICAL MEDICINE AND REHABILITATION  [address] [place] [id number]  Dept: [phone]  Dept Fax: [phone]    New Patient Vamshi Guerin, 38 y.o., male, is c/o of Neck Pain   and request for evaluation of by Marianela Zapata MD.    HPI:      HPI    Since last visit has been doing fairly well. He does note few days of significant improvement after the trigger point injection however then symptoms returned. He was waiting for approval for therapy has just started had only 1 or 2 visits starting last week. He notes they are working on stretching massage range of motion ,posture, etc.    However continues with ups and downs regards to the left posterior shoulder and scapular discomfort. He takes Flexeril 5 mg occasionally at nighttime. He did try routine twice daily for a week with some improvement. He is noted to have mildly high blood pressure he is following up with his PCP. Denies any significant change in his pain pattern. 6/22/2022 MRI thoracic spine  IMPRESSION:  Normal plain MRI examination of the thoracic spine. 4/19/2022 x-ray thoracic spine\  IMPRESSION:     No acute osseous abnormality of the thoracic spine. 7/27/2022 x-ray cervical spine  IMPRESSION:   1. Normal alignment of the cervical spine without subluxation on the flexion and extension views. History  45-year-old right-handed male works for the [employer]. 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 He was poorly off multiple trays and he felt a pop in his mid upper back. He was sent to his PCP. He has had x-rays and MRIs of both the cervical and thoracic spine as well as physical therapy. He has seen Dr. Caren Ambrocio of neurosurgery.   Notes that MRI of the cervical and thoracic spine were normal.  He has had physical therapy and has question some referred pain and shoulder discomfort. As result patient has been referred for further evaluation. He notes weakness on arm extension with any weights. Particularly on the left with discomfort in the mid scapular region greater on the left. He question some burning sensation in the area. Symptoms are better with rest and ice. Physical therapy initially helped however is not sure is making a much more improvement he stopped in mid August.. He notes occasional popping sensation left shoulder as well. He denies any numbness or tingling in the upper extremities. He does note occasional difficulty holding onto things but no nocturnal seizures. He questions possible weakness in the shoulder as well. He has tried meloxicam and Flexeril. Uses Flexeril mostly at nighttime which appears to help. Pain goes from 7-8 out of 10 down to 2-3 out of 10. Per Dr. Drea Gamez reviewed MRI cervical spine does not show any evidence of abnormality that explain his symptoms. Despite that she looks quite healthy. This is consistent with the findings on thoracic MRI as well.,  He reviewed cervical flexion-extension spine films which did not show any abnormalities. Konrad Wise He reviewed shoulder x-ray that was ordered by another provider the left shoulder was read as showing evidence of degenerative changes. X-ray left shoulder 7/28/2022 degenerative changes without acute ossific abnormality    MRI cervical spine 8/9/2022-cervical vertebral body and disc space heights and alignment are not and signal are normal, craniocervical junction is normal, signal in the cervical spinal cord is normal, no disc herniation or spinal stenosis seen, no acute abnormality    History reviewed. No pertinent past medical history. Past Surgical History:   Procedure Laterality Date    TONSILLECTOMY AND ADENOIDECTOMY         No family history on file.     Social History     Tobacco Use    Smoking status: Never    Smokeless tobacco: Never   Substance Use Topics    Alcohol use: Yes        Current Outpatient Medications   Medication Sig Dispense Refill    cyclobenzaprine (FLEXERIL) 5 MG tablet Take 1 tablet by mouth 2 times daily as needed for Muscle spasms 60 tablet 0    meloxicam (MOBIC) 15 MG tablet TAKE 1 TABLET BY MOUTH EVERY DAY      montelukast (SINGULAIR) 10 MG tablet TAKE 1 TABLET BY MOUTH DAILY 90 tablet 0    Multiple Vitamins-Minerals (MULTIVITAMIN ADULT PO) Take by mouth      Fexofenadine HCl (ALLEGRA ALLERGY PO) Take by mouth       No current facility-administered medications for this visit. Allergies   Allergen Reactions    Grass Extracts [Gramineae Pollens]      Other reaction(s): Burning sensation of nose    Seasonal      Other reaction(s): Burning sensation of nose         Subjective:      Review of Systems  CONSTITUTIONAL: Negativefor fever, chills, sweat, fatigue and unexpected weight change. HEENT:  Negative for diplopia, blindspots, blurred vision, hearing loss, trouble chewing or swallowing, denies coughingwhile eating or drinking denies nosebleed    RESPIRATORY: Negativefor wheezing, coughing or shortness of breath    CARDIOVASCULAR: Negative for chest pain, palpitations,lightheadedness  GASTROINTESTINAL: Negative for heartburn, nausea, constipation,diarrhea, abdominal pain  or incontinence  GENTIOURNIARY: Negative for dysuria, urgency, frequency, incontinence and difficultyurinating. ENDOCRINE: Negative forhot or cold intolerance, denies any significant weight change  MUSCULOSKELETAL:Negative for focal joint pain, back pain, neck pain and arthralgias.    NUEROLOGIIC: Negativefor focal weakness?, numbness, tingling, balance loss, headaches or falls  BEHAVIOR/PSYCY:Denies depression, anxiety, memory loss or insomnia  SKIN: Denies ulcers, rashesor bruises     Objective:     Physical Exam   BP (!) 152/110   Pulse 86   Temp 97.6 °F (36.4 °C)   Wt 212 lb (96.2 kg)   BMI 37.55 kg/m²     Nursing notes and vitals reviewed    CONSTITUTIONAL: He is oriented to person, place, and time. He appears well-developed and well-nourished. HEENT: Pupils equal reactiveto light, exact motion intact, visual fields are full, no facial asymmetry, speechfluent, no dysarthria, comprehension intact, object naming intact, repetition intact,basic cognition intact  CARDIOVASCULAR:Heart regular rate and rhythm with no murmurs rubs or gallops   PULMONARY/CHEST: Clear to auscultation with no wheezes or crackles noted  ABDOMEN: Soft, nontender with positive bowel sounds, no guarding or rebound   NEUROLOGIC:    Orientation: Alert and oriented×3,    cranial nerves:  II through XII are intact,   Strength:5 out of 5 throughout bilateral upper and lower extremities including shoulder flexion, extension, abduction, adduction, internal and external rotation. There is question mild tenderness of the lateral shoulder. Drop arm test is negative. There is no sulcus sign, there is no atrophy there is no scapular winging, there is no atrophy   Balance: Intact, Romberg's is negative   Deep tendon reflexes: Bilaterally equal and symmetric, negative Babinski, negativeHoffmann's   Provocative maneuvers:  EXTREMITIES: No calf tenderness, negative Homans, negative warmth, pulses are intact  SKIN: Skin is warm and dry. PSYCIATIRIC: normal mood and affect, behavior is normal. Thought content normal.  Questionable trigger points again and the left posterior shoulder but less sensitive than previously    Assessment:       Diagnosis Orders   1. Left shoulder pain, unspecified chronicity  ALT    AST    MRI SHOULDER LEFT WO CONTRAST             Plan:      82-year-old male working in [employer] has discomfort at mid left thoracic area with radiation into the mid scapular and left shoulder after an injury while moving trays. Also notes some clicking in the left shoulder.   He has seen Dr. Isidor Hamman of neurosurgery-per Dr Isidor Hamman cervical and thoracic MRI is unremarkable, flexion-extension cervical spine unremarkable per Dr. Nora Anderson. X-ray of the left shoulder show some degenerative changes. 1.  Examination notes positive trigger points left upper trapezius and mid thoracic which reproduces symptoms. Had trigger point injections at last visit with some improvement for a few days. Her symptoms have returned. 2.  He continues on  Flexeril 5 mg twice daily as needed-not using as much, he is no longer using anti-inflammatories noted mildly elevated blood pressure-again advised discuss with PCP. CBC and CMP from 11/14/2022 reviewed, also ESR C-reactive protein from July 2022 normal    3. He has had a course of physical therapy, trigger points, he is again trying physical therapy more focused treatment-if symptoms persist after few weeks of therapy will check MRI of the left shoulder-left scapular area, does not want to pursue EMG at this time though he does not have really any numbness or tingling going in the left upper extremity,-suspect this is more musculoskeletal-MRI of the cervical thoracic spine noted and reviewed by NS. Depending on progress/MRI etc. may need referral to orthopedic     4. Above was discussed with patient -he is in agreement with the plan. Follow-up approximately 4 to 6 weeks         Orders Placed This Encounter   Procedures    MRI SHOULDER LEFT WO CONTRAST     Standing Status:   Future     Standing Expiration Date:   12/13/2023     Scheduling Instructions:      Please include Left posterior shoulder and scapula     Order Specific Question:   Reason for exam:     Answer:   pain in Left  shoulder and posterior shoulder / scapula    ALT     Standing Status:   Future     Standing Expiration Date:   12/12/2023    AST     Standing Status:   Future     Standing Expiration Date:   12/13/2023     No orders of the defined types were placed in this encounter. No follow-ups on file. Electronically signedby Iban Timmons. Nicole Jay MD on 12/12/2022 at 5:32 PM    Please note that this chart was generated using voicerecognition Dragon dictation software. Although every effort was made to ensurethe accuracy of this automated transcription, some errors in transcription may haveoccurred.

## 2022-12-15 ENCOUNTER — HOSPITAL ENCOUNTER (OUTPATIENT)
Dept: PHYSICAL THERAPY | Facility: CLINIC | Age: 42
Setting detail: THERAPIES SERIES
Discharge: HOME | End: 2022-12-15
Payer: OTHER GOVERNMENT

## 2022-12-15 PROCEDURE — 97110 THERAPEUTIC EXERCISES: CPT

## 2022-12-15 NOTE — FLOWSHEET NOTE
[] [facility]  [address]  [phone]  F: [phone] [x] [address]  [facility]   [address]  P: [phone]  F: [phone] [] [address]  [address]  P: [phone]  F: [phone] [] [address]  P: [phone]  F: [phone] [] [address]  [facility]   [address]   [place]: [phone]  F: [phone]      Physical Therapy Daily Treatment Note    Date:  12/15/2022  Patient Name:  Becky Boston    :  1980  MRN: [id number]  Physician: Chalo Christie MD                                   Insurance: [address] [place] [address] (16 visits approved through 2023, goes through a [employer] comp division of  - \"line of duty care\")  Medical Diagnosis: spasms of muscle, myofascial pain on left side, trigger point of left shoulder region  Left mid scapular pain, left shoulder pain                     Rehab Codes: M54.6, M62.830, M79.1  Onset Date:  ~ January 15, 2022                   Next 's appt: 2022    Visit# / total visits:     Cancels/No Shows: 0/0    Subjective:    Pain:  [x] Yes  [] No Location:  thoracic paraspinals and scapular, upper traps, left medial/inferior scapular border  Pain Rating: (0-10 scale) 3/10 at currently, can reach 7-8/10 pain   Pain altered Tx:  [x] No  [] Yes  Action:  Comments: Pt arrives 30 minutes late due to traffic. Pt reports he was very sore and in more pain following manual from initial evaluation. Pt notes the most pain he feels in along the medial borders of the scapula.        Objective:  Modalities:    Prone on table, hypervolt with flexible round tip, T5-T10 paraspinal region, left  Massage to thoracic region, trigger point release to areas of palpable tightness. (Left T6-T9 region, left upper trap, right upper traP) Slow MFR to lats, upper traps, spinal extensors  HP to thoracic and cervical spine supine x10'   Precautions: blood pressure - running a little high 140/90's at last physician visit, now holding meloxicam   AA-ROM, mild PRE, massage, stretching, HEP per script  Exercises:  Exercise Reps/ Time Weight/ Level Comments         Seated       Thoracic rotation 10x 10sec Hands crossed in front   Arms flexion overhead with thoracic extension 10x 5 sed     Upper trap stretch   3x30\"        Levator stretch    3x30\"       Scap retraction   10x       Rotation   5xea      Posterior shoulder rolls   10x       Chin tucks   10x      Lower cervical and upper thoracic stretch   (Clasp) hands in front   3x10\"            Side lying       Thoracic Rotation with Open book  5x                  Supine       Consider supine on 1/2 foam roll with stabilization exercises   Add as able          Prone      Add as able- scap strengthening                                 Standing    Add as able   Doorway pec stretch       Forearms Thoracic Rotation                   Other:    Specific Instructions for next treatment:  May try kinesiotaping  Monitor response to above  Prone strengthening      Treatment Charges: Mins Units   [x]  Modalities 10 --   [x]  Ther Exercise 30 2   []  Manual Therapy     []  Ther Activities     []  Aquatics     []  Vasocompression     []  Other     Total Treatment time 30 2       Assessment: [x] Progressing toward goals. Initiated treatment with seated cervical and postural exercises. Demonstration and VC's provided throughout to avoid shoulder elevation and body lean compensation. Pt is able to complete stretches asked of him however, notes \"it is an uncomfortable feeling\". Ended with HP to thoracic and cervical spine to improve blood flow and reduce muscular soreness. Pt denies any increase in pain post session.  Extended time spent with educating pt on proper completion of HEP, use of pillows for support when sleeping, and utilizing heat to reduce muscular tension and pain. Will assess carry over of this next session. [] No change. [] Other:  [x] Patient would continue to benefit from skilled physical therapy services in order to: decrease pain and tightness in thoracic spine and upper traps that are limiting simple ADL's at home. To include manual work, Myofasical release and gentle strengthening to work on mobility of thoracic spine and scapula and progress to strengthening to promote scapular retraction and spinal extension in thoracic spine. Problem list, as detailed above:   [x] ? Pain left thoracic, scapular region > right                         [x] ? ROM decreased thoracic spinal mobility                          [x] ? Strength   In left shoulder and shoulder girdle. [x] ? Function: Increased pain in thoracic region with simple tasks with arms stretched forward, washing dishes, etc  [x] Postural Deviations- increased kyphosis with forward head  [x] Other trigger point palpated left inferior medial scapula, left upper trap (to a lesser degree right upper trap)     Pain appears to be originating more in thoracic spine and left periscapular region than in shoulder joint itself. STG: (to be met in 8 treatments)  ? Pain:from 3/10 to 1/10 with completing light home tasks  ? ROM: Demonstrate sitting and actively extending thoracic spine without increased pain for demonstrate improved mobility of thoracic spine  ? Strength: Initiate prone scapular strengthening to improve upright posturing (decrease IR of shoulders)  ? Function: Able to hold 3# (coffee cup, groceries) in front of him for 20 seconds without increased pain in scapula/thoracic spine. Patient to be independent with home exercise program as demonstrated by performance with correct form without cues.      LTG: (to be met in 16 treatments)  Able to complete home cleaning, laundry, vacuuming with little to no difficulty. Able to reach forward holding up to 10# to put groceries away, use tools at home  Strength in left scapula = to right to decrease strain and pain in thoracic paraspinals  No reports of stabbing pain or pins and needles in thoracic spine and scapular to allow him to maintain active lifestyle including working for [employer]                    Patient goals:  be pain free    Pt. Education:  [x] Yes  [] No  [x] Reviewed Prior HEP/Ed  Method of Education: [x] Verbal  [x] Demo  [x] Written  Comprehension of Education:  [x] Verbalizes understanding. [x] Demonstrates understanding. [x] Needs review. - postural awareness   [] Demonstrates/verbalizes HEP/Ed previously given. Access Code: [email or web address]  URL: [email or web address]  Date: 12/08/2022  Prepared by:  Tomasz Doan  Seated Trunk Rotation - Arms Crossed - 2 x daily - 7 x weekly - 1 sets - 10 reps - 10 hold  Seated Thoracic Extension and Rotation with Reach - 2 x daily - 7 x weekly - 1 sets - 10 reps - 5 hold  Date: 12/15/2022  Prepared by: Jaden Bee  Sidelying Thoracic Rotation with Open Book - 1 x daily - 7 x weekly - 3 sets - 10 reps  Standing Lower Cervical and Upper Thoracic Stretch - 1 x daily - 7 x weekly - 3 sets - 10 reps  Standing Thoracic Spine Stretch - 1 x daily - 7 x weekly - 3 sets - 10 reps  Standing with Forearms Thoracic Rotation - 1 x daily - 7 x weekly - 3 sets - 10 reps  Seated Cervical Retraction - 1 x daily - 7 x weekly - 3 sets - 10 reps  Doorway Pec Stretch at 90 Degrees Abduction - 1 x daily - 7 x weekly - 3 sets - 10 reps  Seated Upper Trapezius Stretch - 1 x daily - 7 x weekly - 3 sets - 10 reps  Gentle Levator Scapulae Stretch - 1 x daily - 7 x weekly - 3 sets - 10 reps  Seated Scapular Retraction - 1 x daily - 7 x weekly - 3 sets - 10 reps  Seated Shoulder Rolls - 1 x daily - 7 x weekly - 3 sets - 10 reps  Hooklying Shoulder Flexion Extension AROM on Foam Roll - 1 x daily - 7 x weekly - 3 sets - 10 reps       Plan: [x] Continue current frequency toward long and short term goals. [x] Specific Instructions for subsequent treatments: progress per poc.        Time In: 5:30 pm            Time Out: 6:10 pm    Electronically signed by:  Sue Chowdary PTA

## 2022-12-19 ENCOUNTER — HOSPITAL ENCOUNTER (OUTPATIENT)
Dept: PHYSICAL THERAPY | Facility: CLINIC | Age: 42
Setting detail: THERAPIES SERIES
Discharge: HOME | End: 2022-12-19
Payer: OTHER GOVERNMENT

## 2022-12-19 PROCEDURE — 97140 MANUAL THERAPY 1/> REGIONS: CPT

## 2022-12-19 PROCEDURE — 97110 THERAPEUTIC EXERCISES: CPT

## 2022-12-19 NOTE — FLOWSHEET NOTE
[] [facility]  Outpatient Rehabilitation &  Therapy  [address]  [phone]  F: [phone] [x] [address]  [facility]   [address]  P: [phone]  F: [phone] [] [place] &  Therapy  [address]  P: [phone]  F: [phone] [] [address]  P: [phone]  F: [phone] [] [address]  [facility]   [address]   [place]: [phone]  F: [phone]      Physical Therapy Daily Treatment Note    Date:  2022  Patient Name:  Denver Aguillon    :  1980  MRN: [id number]  Physician: Elzbieta Caballero MD                                   [facility] (16 visits approved through 2023, goes through a workers comp division of  - \"line of duty care\")  Medical Diagnosis: spasms of muscle, myofascial pain on left side, trigger point of left shoulder region  Left mid scapular pain, left shoulder pain                     Rehab Codes: M54.6, M62.830, M79.1  Onset Date:  ~ January 15, 2022                   Next 's appt: 2022    Visit# / total visits: 3/16    Cancels/No Shows: 0/0    Subjective:    Pain:  [x] Yes  [] No Location: left medial/inferior scapular border  Pain Rating: (0-10 scale) 2/10 \"hasn't done much today yet or physical over the weekend\"  Pain altered Tx: \"    [x] No  [] Yes  Action:  Comments: \"not bad today\"  thinks stretches over weekend \"good\"  Forgot to show me something at eval -  that causes pain  Found heat to be beneficial.    Awaiting call for auth for MRI , then will schedule      Objective:  Modalities:    Sitting active exercises   Prone on table, hypervolt with flexible round tip, T5-T10 paraspinal region, left 8 min  HP to thoracic and cervical spine supine x10'   4.   Kinesiotape 2 \"I\" strips to left rhomboid to assist with relaxation, (anchor on scapula, 20% tension)    Precautions: blood pressure - running a little high 140/90's at last physician visit, now holding meloxicam   AA-ROM, mild PRE, massage, stretching, HEP per script  Exercises:  Exercise Reps/ Time Weight/ Level Comments         Seated       Thoracic rotation 10x 10sec Hands crossed in front   Arms flexion overhead with thoracic extension 10x 5 sec     Upper trap stretch   3x30\"        Levator stretch    3x30\"       Scap retraction   10x       Rotation   5xea      Posterior shoulder rolls   10x       Chin tucks   10x      Lower cervical and upper thoracic stretch   (Clasp) hands in front   3x10\"            Side lying       Thoracic Rotation with Open book  10x                  Supine       Consider supine on 1/2 foam roll with stabilization exercises   Add as able          Prone      Add as able- scap strengthening                                 Standing       Doorway pec stretch  5x 10 sec                      Other:    Specific Instructions for next treatment:    Monitor response to kinesiotape  Prone strengthening    Treatment Charges: Mins Units   [x]  Modalities- hot pack 8 0   [x]  Ther Exercise 30 2   [x]  Manual Therapy 10 1   []  Ther Activities     []  Aquatics     []  Vasocompression     [x]  Other- kinesiotape 5 0   Total Treatment time 53 2       Assessment: [x] Progressing toward goals. Initiated treatment with seated cervical and postural exercises. Then hypervolt to left scapular region. Ended with HP to thoracic and cervical spine to improve blood flow and reduce muscular soreness which patient reports was beneficial after last session. Trial with kinesiotape to left rhomboids. [] No change.      [x] Other:+ left scapula cracking and palpable knotting (none on right)  [x] Patient would continue to benefit from skilled physical therapy services in order to: decrease pain and tightness in thoracic spine and upper traps that are limiting simple ADL's at home. To include manual work, Myofasical release and gentle strengthening to work on mobility of thoracic spine and scapula and progress to strengthening to promote scapular retraction and spinal extension in thoracic spine. Problem list, as detailed above:   [x] ? Pain left thoracic, scapular region > right                         [x] ? ROM decreased thoracic spinal mobility                          [x] ? Strength   In left shoulder and shoulder girdle. [x] ? Function: Increased pain in thoracic region with simple tasks with arms stretched forward, washing dishes, etc  [x] Postural Deviations- increased kyphosis with forward head  [x] Other trigger point palpated left inferior medial scapula, left upper trap (to a lesser degree right upper trap)     Pain appears to be originating more in thoracic spine and left periscapular region than in shoulder joint itself. STG: (to be met in 8 treatments)  ? Pain:from 3/10 to 1/10 with completing light home tasks  ? ROM: Demonstrate sitting and actively extending thoracic spine without increased pain for demonstrate improved mobility of thoracic spine  ? Strength: Initiate prone scapular strengthening to improve upright posturing (decrease IR of shoulders)  ? Function: Able to hold 3# (coffee cup, groceries) in front of him for 20 seconds without increased pain in scapula/thoracic spine. Patient to be independent with home exercise program as demonstrated by performance with correct form without cues. LTG: (to be met in 16 treatments)  Able to complete home cleaning, laundry, vacuuming with little to no difficulty.   Able to reach forward holding up to 10# to put groceries away, use tools at home  Strength in left scapula = to right to decrease strain and pain in thoracic paraspinals  No reports of stabbing pain or pins and needles in thoracic spine and scapular to allow him to maintain active lifestyle including working for [employer]                    Patient goals:  be pain free    Pt. Education:  [] Yes  [] No  [x] Reviewed Prior HEP/Ed  Method of Education: [x] Verbal  [x] Demo  [] Written  Comprehension of Education:  [] Verbalizes understanding. [] Demonstrates understanding. [] Needs review. - postural awareness   [x] Demonstrates/verbalizes HEP/Ed previously given. Access Code: [email or web address]  URL: [employer]/  Date: 12/08/2022  Prepared by: Etelvina Honeycutt  Seated Trunk Rotation - Arms Crossed - 2 x daily - 7 x weekly - 1 sets - 10 reps - 10 hold  Seated Thoracic Extension and Rotation with Reach - 2 x daily - 7 x weekly - 1 sets - 10 reps - 5 hold  Date: 12/15/2022  Prepared by: Adan Plan  Sidelying Thoracic Rotation with Open Book - 1 x daily - 7 x weekly - 3 sets - 10 reps  Standing Lower Cervical and Upper Thoracic Stretch - 1 x daily - 7 x weekly - 3 sets - 10 reps  Standing Thoracic Spine Stretch - 1 x daily - 7 x weekly - 3 sets - 10 reps  Standing with Forearms Thoracic Rotation - 1 x daily - 7 x weekly - 3 sets - 10 reps  Seated Cervical Retraction - 1 x daily - 7 x weekly - 3 sets - 10 reps  Doorway Pec Stretch at 90 Degrees Abduction - 1 x daily - 7 x weekly - 3 sets - 10 reps  Seated Upper Trapezius Stretch - 1 x daily - 7 x weekly - 3 sets - 10 reps  Gentle Levator Scapulae Stretch - 1 x daily - 7 x weekly - 3 sets - 10 reps  Seated Scapular Retraction - 1 x daily - 7 x weekly - 3 sets - 10 reps  Seated Shoulder Rolls - 1 x daily - 7 x weekly - 3 sets - 10 reps  Hooklying Shoulder Flexion Extension AROM on Foam Roll - 1 x daily - 7 x weekly - 3 sets - 10 reps       Plan: [x] Continue current frequency toward long and short term goals. [x] Specific Instructions for subsequent treatments: progress per poc.        Time In:  11:08 am           Time Out:  12:04 pm    Electronically signed by:  Edwar Jarrett, PT

## 2022-12-22 ENCOUNTER — HOSPITAL ENCOUNTER (OUTPATIENT)
Dept: PHYSICAL THERAPY | Facility: CLINIC | Age: 42
Setting detail: THERAPIES SERIES
Discharge: HOME | End: 2022-12-22
Payer: OTHER GOVERNMENT

## 2022-12-22 PROCEDURE — 97140 MANUAL THERAPY 1/> REGIONS: CPT

## 2022-12-22 PROCEDURE — 97110 THERAPEUTIC EXERCISES: CPT

## 2022-12-22 NOTE — FLOWSHEET NOTE
[] [facility]  [address]  [phone]  F: [phone] [x] [address]  [facility]   [address]  P: [phone]  F: [phone] [] [address]  [address]  P: [phone]  F: [phone] [] [address]  P: [phone]  F: [phone] [] [address]  [facility]   [address]   [place]: [phone]  F: [phone]      Physical Therapy Daily Treatment Note    Date:  2022  Patient Name:  Adolph Crooks    :  1980  MRN: [id number]  Physician: Valeria Mares MD                                   Insurance: [address] [place] [address] (16 visits approved through 2023, goes through a workers comp division of  - \"line of duty care\")  Medical Diagnosis: spasms of muscle, myofascial pain on left side, trigger point of left shoulder region  Left mid scapular pain, left shoulder pain                     Rehab Codes: M54.6, M62.830, M79.1  Onset Date:  ~ January 15, 2022                   Next 's appt: 2022    Visit# / total visits:     Cancels/No Shows: 0/0    Subjective:    Pain:  [x] Yes  [] No Location: left medial/inferior scapular border  Pain Rating: (0-10 scale) 2-3/10   Pain altered Tx: \"    [x] No  [] Yes  Action:  Comments: Pt has an MRI for L shoulder scheduled on . Presents with Kinesiotape donned noting positive relief. Objective:  Modalities:    Sitting active exercises followed by new prone strengthening exercises   Prone on table, hypervolt with flexible round tip, T5-T10 paraspinal region, left 8 min  HP to thoracic and cervical spine/B UT's in supine x10'   4.   Kinesiotape  2 \"I\" strips to left rhomboid to assist with relaxation, (anchor on scapula, 20% tension)    Precautions: blood pressure - running a little high 140/90's at last physician visit, now holding meloxicam   AA-ROM, mild PRE, massage, stretching, HEP per script  Exercises:  Exercise Reps/ Time Weight/ Level Comments         Seated       Thoracic rotation 10x 10sec Hands crossed in front   Arms flexion overhead with thoracic extension 10x 5 sec     Upper trap stretch   3x30\"         Levator stretch    3x30\"       Scap retraction   10x       C-Rotation   5xea      Posterior shoulder rolls   10x       Chin tucks   10x   Pt notes pop with every rep that causes min pain and is annoying 12/22   Lower cervical and upper thoracic stretch   (Clasp) hands in front   5x10\"            Side lying       Thoracic Rotation with Open book  10x ea                 Supine       Consider supine on 1/2 foam roll with stabilization exercises   Add as able          Prone on TG     Added 12/22    rows  10x        extension  10x        HAB  10x             Standing       Doorway pec stretch  5x 10 sec                      Other:    Specific Instructions for next treatment:        Treatment Charges: Mins Units   [x]  Modalities- hot pack 10 -   [x]  Ther Exercise 37 2   [x]  Manual Therapy 8 1   []  Ther Activities     []  Aquatics     []  Vasocompression     [x]  Other- kinesiotape 5 0   Total Treatment time 50 3       Assessment: [x] Progressing toward goals. Initiated session with seated stretches to improve mobility. Incorporated prone strengthening program this date. Pt notes min irritation and N/T during HAB. Continued with manual to L thoracic paraspinals to reduce muscular tension. Applied HP to cervical and thoracic spine to promote relaxation of musculature. Reapplied k-tape at end of session and issued 2 'I' strips for home application as needed. Will monitor sx's and progress program to tolerance. [] No change.      [x] Other:+ left scapula cracking and palpable knotting (none on right)  [x] Patient would continue to benefit from skilled physical therapy services in order to: decrease pain and tightness in thoracic spine and upper traps that are limiting simple ADL's at home. To include manual work, Myofasical release and gentle strengthening to work on mobility of thoracic spine and scapula and progress to strengthening to promote scapular retraction and spinal extension in thoracic spine. Problem list, as detailed above:   [x] ? Pain left thoracic, scapular region > right                         [x] ? ROM decreased thoracic spinal mobility                          [x] ? Strength   In left shoulder and shoulder girdle. [x] ? Function: Increased pain in thoracic region with simple tasks with arms stretched forward, washing dishes, etc  [x] Postural Deviations- increased kyphosis with forward head  [x] Other trigger point palpated left inferior medial scapula, left upper trap (to a lesser degree right upper trap)     Pain appears to be originating more in thoracic spine and left periscapular region than in shoulder joint itself. STG: (to be met in 8 treatments)  ? Pain:from 3/10 to 1/10 with completing light home tasks  ? ROM: Demonstrate sitting and actively extending thoracic spine without increased pain for demonstrate improved mobility of thoracic spine  ? Strength: Initiate prone scapular strengthening to improve upright posturing (decrease IR of shoulders)  ? Function: Able to hold 3# (coffee cup, groceries) in front of him for 20 seconds without increased pain in scapula/thoracic spine. Patient to be independent with home exercise program as demonstrated by performance with correct form without cues. LTG: (to be met in 16 treatments)  Able to complete home cleaning, laundry, vacuuming with little to no difficulty.   Able to reach forward holding up to 10# to put groceries away, use tools at home  Strength in left scapula = to right to decrease strain and pain in thoracic paraspinals  No reports of stabbing pain or pins and needles in thoracic spine and scapular to allow him to maintain active lifestyle including working for [employer]                    Patient goals:  be pain free    Pt. Education:  [x] Yes  [] No  [x] Reviewed Prior HEP/Ed  Method of Education: [x] Verbal posture  [x] Demo new prone ex's  [] Written  Comprehension of Education:  [] Verbalizes understanding. [] Demonstrates understanding. [] Needs review. - postural awareness   [x] Demonstrates/verbalizes HEP/Ed previously given. Access Code: [email or web address]  URL: [employer]/  Date: 12/08/2022  Prepared by: Charly Colon  Seated Trunk Rotation - Arms Crossed - 2 x daily - 7 x weekly - 1 sets - 10 reps - 10 hold  Seated Thoracic Extension and Rotation with Reach - 2 x daily - 7 x weekly - 1 sets - 10 reps - 5 hold  Date: 12/15/2022  Prepared by: Sonja Atkinson  Sidelying Thoracic Rotation with Open Book - 1 x daily - 7 x weekly - 3 sets - 10 reps  Standing Lower Cervical and Upper Thoracic Stretch - 1 x daily - 7 x weekly - 3 sets - 10 reps  Standing Thoracic Spine Stretch - 1 x daily - 7 x weekly - 3 sets - 10 reps  Standing with Forearms Thoracic Rotation - 1 x daily - 7 x weekly - 3 sets - 10 reps  Seated Cervical Retraction - 1 x daily - 7 x weekly - 3 sets - 10 reps  Doorway Pec Stretch at 90 Degrees Abduction - 1 x daily - 7 x weekly - 3 sets - 10 reps  Seated Upper Trapezius Stretch - 1 x daily - 7 x weekly - 3 sets - 10 reps  Gentle Levator Scapulae Stretch - 1 x daily - 7 x weekly - 3 sets - 10 reps  Seated Scapular Retraction - 1 x daily - 7 x weekly - 3 sets - 10 reps  Seated Shoulder Rolls - 1 x daily - 7 x weekly - 3 sets - 10 reps  Hooklying Shoulder Flexion Extension AROM on Foam Roll - 1 x daily - 7 x weekly - 3 sets - 10 reps       Plan: [x] Continue current frequency toward long and short term goals. [x] Specific Instructions for subsequent treatments: progress per poc.        Time In: 2:06 pm           Time Out: 3:06  pm    Electronically signed by:  Faye Brewer PTA

## 2022-12-29 ENCOUNTER — HOSPITAL ENCOUNTER (OUTPATIENT)
Dept: PHYSICAL THERAPY | Facility: CLINIC | Age: 42
Setting detail: THERAPIES SERIES
Discharge: HOME | End: 2022-12-29
Payer: OTHER GOVERNMENT

## 2022-12-29 PROCEDURE — 97110 THERAPEUTIC EXERCISES: CPT

## 2022-12-29 PROCEDURE — 97140 MANUAL THERAPY 1/> REGIONS: CPT

## 2022-12-29 NOTE — FLOWSHEET NOTE
[] [facility]  [address]  [phone]  F: [phone] [x] [address]  [facility]   [address]  P: [phone]  F: [phone] [] [address]  [address]  P: [phone]  F: [phone] [] [address]  P: [phone]  F: [phone] [] [address]  [facility]   [address]   [place]: [phone]  F: [phone]      Physical Therapy Daily Treatment Note    Date:  2022  Patient Name:  Esther Leija    :  1980  MRN: [id number]  Physician: Roya Fortune MD                                   Insurance: [address] [place] [address] (16 visits approved through 2023, goes through a workers comp division [facility]\")  Medical Diagnosis: spasms of muscle, myofascial pain on left side, trigger point of left shoulder region  Left mid scapular pain, left shoulder pain                     Rehab Codes: M54.6, M62.830, M79.1  Onset Date:  ~ January 15, 2022                   Next 's appt: 2022    Visit# / total visits: 5/16    Cancels/No Shows: 0/0    Subjective:    Pain:  [x] Yes  [] No Location: left medial/inferior scapular border          Pain Rating: (0-10 scale) 2/10   Pain altered Tx:     [x] No  [] Yes  Action:  Comments: Pt reports he had some pain today. He reports he had some increased pain this morning. He states he thinks the kinesiotape is helping \"keep things in place. \"       Objective:  Modalities:    Sitting active exercises followed by new prone strengthening exercises   Prone on table, hypervolt with flexible round tip, T5-T10 paraspinal region, left 8 min  HP to thoracic and cervical spine/B UT's in supine x10'   4.   Kinesiotape  2 \"I\" strips to left rhomboid to assist with relaxation, (anchor on scapula, 20% tension)    Precautions: blood pressure - running a little high 140/90's at last physician visit, now holding meloxicam   AA-ROM, mild PRE, massage, stretching, HEP per script  Exercises:  Exercise Reps/ Time Weight/ Level Comments         Seated       Thoracic rotation 10x 10sec Hands crossed in front   Arms flexion overhead with thoracic extension 10x 5 sec  felt pinching in between shoulder blades 12/29   Upper trap stretch   3x30\"         Levator stretch    3x30\"       Scap retraction   10x       C-Rotation   5xea      Posterior shoulder rolls   10x    audible popping in L shoulder area   Chin tucks   10x   Pt notes pop with every rep that causes min pain and is annoying 12/22   Lower cervical and upper thoracic stretch   (Clasp) hands in front   5x10\"            Side lying       Thoracic Rotation with Open book  10x ea                 Supine       Consider supine on 1/2 foam roll with stabilization exercises   Add as able          Prone on TG     Added 12/22    rows  10x        extension  10x        HAB  10x             Standing       Doorway pec stretch  5x 10 sec                      Other:    Specific Instructions for next treatment:        Treatment Charges: Mins Units   [x]  Modalities- hot pack 10 -   [x]  Ther Exercise 35 2   [x]  Manual Therapy 8 1   []  Ther Activities     []  Aquatics     []  Vasocompression     [x]  Other- kinesiotape 5 0   Total Treatment time 48 3       Assessment: [x] Progressing toward goals. Pt had a fair tolerance to treatment today. Pt noted some \"pinching\" in his shoulder blades with arm flexion and thoracic extension stretch and felt like \"pins and needles\" following. He also had some audible popping in his L scapula area with posterior shoulder rolls. Continued with hypervolt to T spine and L scapula border and patient had relief with this. Finished treatment with heat pack and replaced kinesiotape strips.      [] No change. [] Other:  [x] Patient would continue to benefit from skilled physical therapy services in order to: decrease pain and tightness in thoracic spine and upper traps that are limiting simple ADL's at home. To include manual work, Myofasical release and gentle strengthening to work on mobility of thoracic spine and scapula and progress to strengthening to promote scapular retraction and spinal extension in thoracic spine. Problem list, as detailed above:   [x] ? Pain left thoracic, scapular region > right                         [x] ? ROM decreased thoracic spinal mobility                          [x] ? Strength   In left shoulder and shoulder girdle. [x] ? Function: Increased pain in thoracic region with simple tasks with arms stretched forward, washing dishes, etc  [x] Postural Deviations- increased kyphosis with forward head  [x] Other trigger point palpated left inferior medial scapula, left upper trap (to a lesser degree right upper trap)     Pain appears to be originating more in thoracic spine and left periscapular region than in shoulder joint itself. STG: (to be met in 8 treatments)  ? Pain:from 3/10 to 1/10 with completing light home tasks  ? ROM: Demonstrate sitting and actively extending thoracic spine without increased pain for demonstrate improved mobility of thoracic spine  ? Strength: Initiate prone scapular strengthening to improve upright posturing (decrease IR of shoulders)  ? Function: Able to hold 3# (coffee cup, groceries) in front of him for 20 seconds without increased pain in scapula/thoracic spine. Patient to be independent with home exercise program as demonstrated by performance with correct form without cues. LTG: (to be met in 16 treatments)  Able to complete home cleaning, laundry, vacuuming with little to no difficulty.   Able to reach forward holding up to 10# to put groceries away, use tools at home  Strength in left scapula = to right to decrease strain and pain in thoracic paraspinals  No reports of stabbing pain or pins and needles in thoracic spine and scapular to allow him to maintain active lifestyle including working for [employer]                    Patient goals:  be pain free    Pt. Education:  [] Yes  [] No  [x] Reviewed Prior HEP/Ed  Method of Education: [] Verbal posture  [] Demo new prone ex's  [] Written  Comprehension of Education:  [] Verbalizes understanding. [] Demonstrates understanding. [] Needs review. - postural awareness   [x] Demonstrates/verbalizes HEP/Ed previously given. Access Code: [email or web address]  URL: [employer]/  Date: 12/08/2022  Prepared by: Reyes Raymond  Seated Trunk Rotation - Arms Crossed - 2 x daily - 7 x weekly - 1 sets - 10 reps - 10 hold  Seated Thoracic Extension and Rotation with Reach - 2 x daily - 7 x weekly - 1 sets - 10 reps - 5 hold  Date: 12/15/2022  Prepared by: Melly Vogt  Sidelying Thoracic Rotation with Open Book - 1 x daily - 7 x weekly - 3 sets - 10 reps  Standing Lower Cervical and Upper Thoracic Stretch - 1 x daily - 7 x weekly - 3 sets - 10 reps  Standing Thoracic Spine Stretch - 1 x daily - 7 x weekly - 3 sets - 10 reps  Standing with Forearms Thoracic Rotation - 1 x daily - 7 x weekly - 3 sets - 10 reps  Seated Cervical Retraction - 1 x daily - 7 x weekly - 3 sets - 10 reps  Doorway Pec Stretch at 90 Degrees Abduction - 1 x daily - 7 x weekly - 3 sets - 10 reps  Seated Upper Trapezius Stretch - 1 x daily - 7 x weekly - 3 sets - 10 reps  Gentle Levator Scapulae Stretch - 1 x daily - 7 x weekly - 3 sets - 10 reps  Seated Scapular Retraction - 1 x daily - 7 x weekly - 3 sets - 10 reps  Seated Shoulder Rolls - 1 x daily - 7 x weekly - 3 sets - 10 reps  Hooklying Shoulder Flexion Extension AROM on Foam Roll - 1 x daily - 7 x weekly - 3 sets - 10 reps       Plan: [x] Continue current frequency toward long and short term goals.     [x] Specific Instructions for subsequent treatments: progress per poc.        Time In:  5:12 pm           Time Out: 6:15  pm    Electronically signed by:  Frederick Cruz PTA

## 2023-01-03 ENCOUNTER — HOSPITAL ENCOUNTER (OUTPATIENT)
Dept: MRI IMAGING | Age: 43
Discharge: HOME | End: 2023-01-05
Payer: OTHER GOVERNMENT

## 2023-01-03 DIAGNOSIS — M25.512 LEFT SHOULDER PAIN, UNSPECIFIED CHRONICITY: ICD-10-CM

## 2023-01-03 PROCEDURE — 73221 MRI JOINT UPR EXTREM W/O DYE: CPT

## 2023-01-06 ENCOUNTER — HOSPITAL ENCOUNTER (OUTPATIENT)
Dept: PHYSICAL THERAPY | Facility: CLINIC | Age: 43
Setting detail: THERAPIES SERIES
Discharge: HOME | End: 2023-01-06

## 2023-01-06 NOTE — RESULT ENCOUNTER NOTE
Discussed with patient-reviewed MRI left shoulder results-mild supraspinatus tendinosis, no rotator cuff tear, mild to moderate AC joint degenerative changes, small volume subacromial subdeltoid bursal fluid. He notes symptoms are about the same. No change in pain pattern. Due to continued symptoms despite physical therapy, trigger point injections, neurosurgical evaluation, etc. -we ask to be seen by orthopedic -Dr. Neli Uribe for further recommendations and evaluation. Patient agreeable.   He will call the office Monday to talk to Crystal Toure to obtain paperwork for referral.

## 2023-01-06 NOTE — FLOWSHEET NOTE
[] [facility] ([facility]) - [facility] &  Therapy  [address]    [phone]  F: [phone]   [x] [address]  [facility] [address]   [address]  P: [phone]  F: [phone]  [] [address] &  Therapy  [address]  P: [phone]  F: [phone] [] [address]  P: [phone]  F: [phone]  [] [address]  [facility]   [address]   [place]: [phone]  F: [phone]   [] [facility]  [address] [facility] [address]  [place]: [phone]   F: [phone]     Physical Therapy Cancel/No Show note    Date: 2023  Patient: Isamar See  : 1980  MRN: [id number]    Cancels/No Shows to date:     For today's appointment patient:    [x]  Cancelled    [] Rescheduled appointment    [] No-show     Reason given by patient:    []  Patient ill    []  Conflicting appointment    [] No transportation      [] Conflict with work    [] No reason given    [] Weather related    [] [id number]    [x] Other:      Comments:  Pt LVM and is out of town.        [x] Next appointment was NOT confirmed    Electronically signed by: Cheyenne Monson PTA

## 2023-01-09 ENCOUNTER — HOSPITAL ENCOUNTER (OUTPATIENT)
Dept: PHYSICAL THERAPY | Facility: CLINIC | Age: 43
Setting detail: THERAPIES SERIES
Discharge: HOME | End: 2023-01-09
Payer: OTHER GOVERNMENT

## 2023-01-09 PROCEDURE — 97140 MANUAL THERAPY 1/> REGIONS: CPT

## 2023-01-09 PROCEDURE — 97110 THERAPEUTIC EXERCISES: CPT

## 2023-01-09 NOTE — RESULT ENCOUNTER NOTE
Dr. Andreea Ellsworth is not in the area. He is in [place]. Pt is in the process of looking up other ortho surg. In the area that he would like to see and how fast he can make appt. Sylvie will work on the [facility] for this.

## 2023-01-09 NOTE — FLOWSHEET NOTE
[] [facility] [address]  [address]  [phone]  F: [phone] [x] [address]  [facility] 36   [address]  P: [phone]  F: [phone] [] [address]  [address] [facility]  P: [phone]  F: [phone] [] [address]  P: [phone]  F: [phone] [] [address]  [facility]   [address]   [place]: [phone]  F: [phone]      Physical Therapy Daily Treatment Note    Date:  2023  Patient Name:  Donita Dyer    :  1980  MRN: [id number]  Physician: Navi Abebe MD                                   Insurance: [address] [place] [address] (16 visits approved through 2023, goes through a workers comp division [facility] duty [facility]\")  Medical Diagnosis: spasms of muscle, myofascial pain on left side, trigger point of left shoulder region  Left mid scapular pain, left shoulder pain                     Rehab Codes: M54.6, M62.830, M79.1  Onset Date:  ~ January 15, 2022                   Next 's appt: 2022    Visit# / total visits:     Cancels/No Shows: 1/0    Subjective:    Pain:  [x] Yes  [] No Location: left medial/inferior scapular border   \"much more today than usual\"        Pain Rating: (0-10 scale) 7/10   Pain altered Tx:     [] No  [x] Yes  Action:manual MFR to mid scapular region and P-A mobs to T7-T12 region - prone  Comments: Pt reports after last visit he was sore for 5 days, unsure that it was really a different treatment previously but it was sore. Kinesiotape has been helpful. Off due to skin irritated. So last 2-3 days more scapular pain across thoracic region.   Carried some boards yesterday - only different activity    Objective:  Modalities:    Sitting active exercises followed by new prone strengthening exercises   Prone on table, hypervolt with flexible round tip, T5-T10 paraspinal region, left 8 min  manual MFR to mid scapular region and P-A mobs to T7-T12 region - prone  HP to thoracic and cervical spine/B UT's in supine x10'   5.   Kinesiotape  2 \"I\" strips one to left rhomboid to assist with relaxation, one to right rhomboid (anchor on scapula, 20% tension)    Precautions: blood pressure - running a little high 140/90's at last physician visit, now holding meloxicam - 1/9 reports doing \"pretty good\"  AA-ROM, mild PRE, massage, stretching, HEP per script  Exercises:  Exercise Reps/ Time Weight/ Level Comments         Seated       Thoracic rotation 10x 10sec Hands crossed in front   Arms flexion overhead with thoracic extension 10x 5 sec  felt pinching in between shoulder blades 12/29   Upper trap stretch   3x30\"      1/9 performed lying   Levator stretch    3x30\"       Scap retraction   10x       C-Rotation   5xea      Posterior shoulder rolls   10x    audible popping in L shoulder area   Chin tucks   10x   Pt notes pop with every rep that causes min pain and is annoying 12/22   Lower cervical and upper thoracic stretch   (Clasp) hands in front   5x10\"            Side lying       Thoracic Rotation with Open book  10x ea     1/2 foam roller, lie, arms overhead, ER of shoulders  2 min          Supine       Consider supine on 1/2 foam roll with stabilization exercises   Add as able          Prone on TG     Added 12/22    rows  10x        extension  10x        HAB  10x             Standing       Doorway pec stretch  5x 10 sec                      Other:    Specific Instructions for next treatment:    Treatment Charges: Mins Units   [x]  Modalities- hot pack 10 -   [x]  Ther Exercise 22 2   [x]  Manual Therapy 18 1   []  Ther Activities     []  Aquatics     []  Vasocompression     [x]  Other- kinesiotape 5 0   Total Treatment time 45 (+ 10 HP not billed) 3       Assessment: [x] Progressing toward goals. Got MRI - saw Dr. Liam Etienne, recommended a sports medicine doctor as his best next step. Today feeling worse than he has since starting PT. More pain in thoracic spine, rhomboids and traps, less reports of shoulder pain. Had discussion on plan and options, educated pt that I think it is a combination of muscular and myofasical strain, posture and muscule imbalances contributing to pain. Goal of finding positions of comfort (lying down) and that even when he is sitting, driving, typing and may not feel like he is doing anything to aggravate it, his postural muscles are still working so there may be a fatigue component. Compounded by probable overuse in the past with carrying backpacks/guard training. He feels like he may see ortho to get another opinion. Also discussed option of dry needling (though injections had minimal long term relief - he can discuss further with physician) Staying active at gym, cardio, LE recommended but not heavy UE wt lifting. [] No change. [x] Other: (gone first week of February 5-17th)  [x] Patient would continue to benefit from skilled physical therapy services in order to: decrease pain and tightness in thoracic spine and upper traps that are limiting simple ADL's at home. To include manual work, Myofasical release and gentle strengthening to work on mobility of thoracic spine and scapula and progress to strengthening to promote scapular retraction and spinal extension in thoracic spine. Problem list, as detailed above:   [x] ? Pain left thoracic, scapular region > right                         [x] ? ROM decreased thoracic spinal mobility                          [x] ? Strength   In left shoulder and shoulder girdle. [x] ?  Function: Increased pain in thoracic region with simple tasks with arms stretched forward, washing dishes, etc  [x] Postural Deviations- increased kyphosis with forward head  [x] Other trigger point palpated left inferior medial scapula, left upper trap (to a lesser degree right upper trap)     Pain appears to be originating more in thoracic spine and left periscapular region than in shoulder joint itself. STG: (to be met in 8 treatments)  ? Pain:from 3/10 to 1/10 with completing light home tasks  ? ROM: Demonstrate sitting and actively extending thoracic spine without increased pain for demonstrate improved mobility of thoracic spine  ? Strength: Initiate prone scapular strengthening to improve upright posturing (decrease IR of shoulders)  ? Function: Able to hold 3# (coffee cup, groceries) in front of him for 20 seconds without increased pain in scapula/thoracic spine. Patient to be independent with home exercise program as demonstrated by performance with correct form without cues. LTG: (to be met in 16 treatments)  Able to complete home cleaning, laundry, vacuuming with little to no difficulty. Able to reach forward holding up to 10# to put groceries away, use tools at home  Strength in left scapula = to right to decrease strain and pain in thoracic paraspinals  No reports of stabbing pain or pins and needles in thoracic spine and scapular to allow him to maintain active lifestyle including working for [employer]                    Patient goals:  be pain free    Pt. Education:  [] Yes  [] No  [x] Reviewed Prior HEP/Ed  Method of Education: [x] Verbal see note above  [] Demo new prone ex's  [] Written  Comprehension of Education:  [] Verbalizes understanding. [] Demonstrates understanding. [] Needs review. - postural awareness   [x] Demonstrates/verbalizes HEP/Ed previously given. Access Code: [email or web address]  URL: [email or web address]  Date: 12/08/2022  Prepared by:  Ana Kaufman  Seated Trunk Rotation - Arms Crossed - 2 x daily - 7 x weekly - 1 sets - 10 reps - 10 hold  Seated Thoracic Extension and Rotation with Reach - 2 x daily - 7 x weekly - 1 sets - 10 reps - 5 hold  Date: 12/15/2022  Prepared by: Inell Constant  Sidelying Thoracic Rotation with Open Book - 1 x daily - 7 x weekly - 3 sets - 10 reps  Standing Lower Cervical and Upper Thoracic Stretch - 1 x daily - 7 x weekly - 3 sets - 10 reps  Standing Thoracic Spine Stretch - 1 x daily - 7 x weekly - 3 sets - 10 reps  Standing with Forearms Thoracic Rotation - 1 x daily - 7 x weekly - 3 sets - 10 reps  Seated Cervical Retraction - 1 x daily - 7 x weekly - 3 sets - 10 reps  Doorway Pec Stretch at 90 Degrees Abduction - 1 x daily - 7 x weekly - 3 sets - 10 reps  Seated Upper Trapezius Stretch - 1 x daily - 7 x weekly - 3 sets - 10 reps  Gentle Levator Scapulae Stretch - 1 x daily - 7 x weekly - 3 sets - 10 reps  Seated Scapular Retraction - 1 x daily - 7 x weekly - 3 sets - 10 reps  Seated Shoulder Rolls - 1 x daily - 7 x weekly - 3 sets - 10 reps  Hooklying Shoulder Flexion Extension AROM on Foam Roll - 1 x daily - 7 x weekly - 3 sets - 10 reps       Plan: [x] Continue current frequency toward long and short term goals. [x] Specific Instructions for subsequent treatments: progress per poc.        Time In:   5: 10 pm          Time Out:  6:05 pm    Electronically signed by:  Zach Zuniga PT

## 2023-01-10 ENCOUNTER — TELEPHONE (OUTPATIENT)
Dept: PHYSICAL MEDICINE AND REHAB | Age: 43
End: 2023-01-10

## 2023-01-10 DIAGNOSIS — M67.912 DISORDER OF BURSAE AND TENDONS IN LEFT SHOULDER REGION: ICD-10-CM

## 2023-01-10 DIAGNOSIS — M25.512 LEFT SHOULDER PAIN, UNSPECIFIED CHRONICITY: Primary | ICD-10-CM

## 2023-01-10 DIAGNOSIS — M71.9 DISORDER OF BURSAE AND TENDONS IN LEFT SHOULDER REGION: ICD-10-CM

## 2023-01-10 DIAGNOSIS — M67.814 TENDINOSIS OF LEFT SHOULDER: ICD-10-CM

## 2023-01-10 NOTE — TELEPHONE ENCOUNTER
I called patient yesterday informing him that dr. Rocio Ndiaye is now located in [place]. This is too far for him. [facility] He is going to trying to locate an ortho surg. Near him and will let us know. In the meantime. I have entered and order external ortho dr. Whitney Lewis that we can get started on the authorization process. Pt will call us back when he has found a physician.

## 2023-01-10 NOTE — TELEPHONE ENCOUNTER
Spoke with patient . He is at a loss. Said for us to go ahead and find someone. Entered the order for dr. Jessie Chaney. Not sure how fast they can see him but will try. Please review order and sign.   Will submit for approval

## 2023-01-13 ENCOUNTER — APPOINTMENT (OUTPATIENT)
Dept: PHYSICAL THERAPY | Facility: CLINIC | Age: 43
End: 2023-01-13
Payer: OTHER GOVERNMENT

## 2023-01-16 ENCOUNTER — HOSPITAL ENCOUNTER (OUTPATIENT)
Dept: PHYSICAL THERAPY | Facility: CLINIC | Age: 43
Setting detail: THERAPIES SERIES
Discharge: HOME | End: 2023-01-16
Payer: OTHER GOVERNMENT

## 2023-01-16 PROCEDURE — 97140 MANUAL THERAPY 1/> REGIONS: CPT

## 2023-01-16 PROCEDURE — 97110 THERAPEUTIC EXERCISES: CPT

## 2023-01-16 NOTE — FLOWSHEET NOTE
[] [facility] [facility]  [address]  [phone]  F: [phone] [x] [address]  [facility] 36   [address]  P: [phone]  F: [phone] [] [address]  [address]  P: [phone]  F: [phone] [] [address]  P: [phone]  F: [phone] [] [address]  [facility]   [address]   [place]: [phone]  F: [phone]      Physical Therapy Daily Treatment Note    Date:  2023  Patient Name:  Vincent Marshall    :  1980  MRN: [id number]  Physician: Vishal Welch MD                                   Insurance: [address] [place] [address] (16 visits approved through 2023, goes through a workers comp division of [facility] [employer] of duty care\")  Medical Diagnosis: spasms of muscle, myofascial pain on left side, trigger point of left shoulder region  Left mid scapular pain, left shoulder pain                     Rehab Codes: M54.6, M62.830, M79.1  Onset Date:  ~ January 15, 2022                   Next 's appt: 2022    Visit# / total visits:     Cancels/No Shows: 1/0    Subjective:    Pain:  [x] Yes  [] No Location: left medial/inferior scapular border       Pain Rating: (0-10 scale) 3-4/10   Pain altered Tx:     [] No  [x] Yes  Action:continuedMFR to mid scapular region and P-A mobs to T7-T12 region - prone  Comments: Pt reports feeling better than last Thursday. Unsure if taping better or not    Objective:  Modalities:    Sitting active exercises followed by new prone strengthening exercises   Prone on table, hypervolt with flexible round tip, T5-T10 paraspinal region, left 8 min  manual MFR to mid scapular region and P-A mobs to T7-T12 region - prone  HELD .   Kinesiotape  2 \"I\" strips one to left rhomboid to assist with relaxation, one to right rhomboid (anchor on scapula, 20% tension)    Precautions: blood pressure - running a little high 140/90's at last physician visit, now holding meloxicam - 1/9 reports doing \"pretty good\"  AA-ROM, mild PRE, massage, stretching, HEP per script  Exercises:  Exercise Reps/ Time Weight/ Level Comments         Seated       Thoracic rotation 10x 10sec Hands crossed in front   Arms flexion overhead with thoracic extension 10x 5 sec  felt pinching in between shoulder blades 12/29   Upper trap stretch   3x30\"      1/9 performed lying   Levator stretch    3x30\"       Scap retraction   10x       C-Rotation   5xea      Posterior shoulder rolls   10x    audible popping in L shoulder area   Chin tucks   10x   Pt notes pop with every rep that causes min pain and is annoying 12/22   Lower cervical and upper thoracic stretch   (Clasp) hands in front   5x10\"            Side lying       Thoracic Rotation with Open book  10x ea     Supine      1/2 foam roller, lie, arms overhead, ER of shoulders  2 min                Prone on TG     Added 12/22    rows  10x        extension  10x        HAB  10x             Standing       Doorway pec stretch  5x 10 sec                      Other:    Specific Instructions for next treatment:    Treatment Charges: Mins Units   []  Modalities- hot pack     [x]  Ther Exercise 20 1   [x]  Manual Therapy 25 2   []  Ther Activities     []  Aquatics     []  Vasocompression     [x]  Other- kinesiotape 5 0   Total Treatment time 50 3       Assessment: [x] Progressing toward goals. Left thoracic/low medial scapula area of most palpable tightness.  Continued with MFR, active exercises, hypervolt and ended with removing and reapplying kinesiotape.  Patient reports he has been putting together a kids playhouse so legs are sore - reminded him that he was still bent forward, reaching, lifting, which may be aggravating factors.      [] No change.     [x] Other:  (gone first week of February 5-17th)    1- trying to get in to see Dr. Fabiola Cummins  [x] Patient would continue to benefit from skilled physical therapy services in order to: decrease pain and tightness in thoracic spine and upper traps that are limiting simple ADL's at home. To include manual work, Myofasical release and gentle strengthening to work on mobility of thoracic spine and scapula and progress to strengthening to promote scapular retraction and spinal extension in thoracic spine. Problem list, as detailed above:   [x] ? Pain left thoracic, scapular region > right                         [x] ? ROM decreased thoracic spinal mobility                          [x] ? Strength   In left shoulder and shoulder girdle. [x] ? Function: Increased pain in thoracic region with simple tasks with arms stretched forward, washing dishes, etc  [x] Postural Deviations- increased kyphosis with forward head  [x] Other trigger point palpated left inferior medial scapula, left upper trap (to a lesser degree right upper trap)     Pain appears to be originating more in thoracic spine and left periscapular region than in shoulder joint itself. STG: (to be met in 8 treatments)  ? Pain:from 3/10 to 1/10 with completing light home tasks  ? ROM: Demonstrate sitting and actively extending thoracic spine without increased pain for demonstrate improved mobility of thoracic spine  ? Strength: Initiate prone scapular strengthening to improve upright posturing (decrease IR of shoulders) 1- met  ? Function: Able to hold 3# (coffee cup, groceries) in front of him for 20 seconds without increased pain in scapula/thoracic spine. Patient to be independent with home exercise program as demonstrated by performance with correct form without cues. LTG: (to be met in 16 treatments)  Able to complete home cleaning, laundry, vacuuming with little to no difficulty.   Able to reach forward holding up to 10# to put groceries away, use tools at home  Strength in left scapula = to right to decrease strain and pain in thoracic paraspinals  No reports of stabbing pain or pins and needles in thoracic spine and scapular to allow him to maintain active lifestyle including working for [employer]                    Patient goals:  be pain free    Pt. Education:  [] Yes  [] No  [x] Reviewed Prior HEP/Ed  Method of Education: [x] Verbal see note above  [] Demo new prone ex's  [] Written  Comprehension of Education:  [] Verbalizes understanding. [] Demonstrates understanding. [] Needs review. - postural awareness   [x] Demonstrates/verbalizes HEP/Ed previously given. Access Code: [email or web address]  URL: [email or web address]  Date: 12/08/2022  Prepared by:  Faina Jain  Seated Trunk Rotation - Arms Crossed - 2 x daily - 7 x weekly - 1 sets - 10 reps - 10 hold  Seated Thoracic Extension and Rotation with Reach - 2 x daily - 7 x weekly - 1 sets - 10 reps - 5 hold  Date: 12/15/2022  Prepared by: Fredo Charlton  Sidelying Thoracic Rotation with Open Book - 1 x daily - 7 x weekly - 3 sets - 10 reps  Standing Lower Cervical and Upper Thoracic Stretch - 1 x daily - 7 x weekly - 3 sets - 10 reps  Standing Thoracic Spine Stretch - 1 x daily - 7 x weekly - 3 sets - 10 reps  Standing with Forearms Thoracic Rotation - 1 x daily - 7 x weekly - 3 sets - 10 reps  Seated Cervical Retraction - 1 x daily - 7 x weekly - 3 sets - 10 reps  Doorway Pec Stretch at 90 Degrees Abduction - 1 x daily - 7 x weekly - 3 sets - 10 reps  Seated Upper Trapezius Stretch - 1 x daily - 7 x weekly - 3 sets - 10 reps  Gentle Levator Scapulae Stretch - 1 x daily - 7 x weekly - 3 sets - 10 reps  Seated Scapular Retraction - 1 x daily - 7 x weekly - 3 sets - 10 reps  Seated Shoulder Rolls - 1 x daily - 7 x weekly - 3 sets - 10 reps  Hooklying Shoulder Flexion Extension AROM on Foam Roll - 1 x daily - 7 x weekly - 3 sets - 10 reps       Plan: [x] Continue current frequency toward long and short term goals. [x] Specific Instructions for subsequent treatments: progress per poc.        Time In:   4:10 pm    Time Out:  5:00 pm    Electronically signed by:  Ricky Pandya PT

## 2023-01-17 ENCOUNTER — TELEPHONE (OUTPATIENT)
Dept: ADMINISTRATIVE | Age: 43
End: 2023-01-17

## 2023-01-17 NOTE — TELEPHONE ENCOUNTER
Pt called stating Dr. Leatha Epley wanted to be made aware of when pt was scheduled to see Dr. Nacho Vidal. His appt is 1/27/23. Please confirm with pt if Dr. Leatha Epley wants him to keep his appt with him on 1/19/23.

## 2023-01-19 ENCOUNTER — HOSPITAL ENCOUNTER (OUTPATIENT)
Dept: PHYSICAL THERAPY | Facility: CLINIC | Age: 43
Setting detail: THERAPIES SERIES
Discharge: HOME | End: 2023-01-19
Payer: OTHER GOVERNMENT

## 2023-01-19 NOTE — FLOWSHEET NOTE
[] [address] [facility]  [address]    [phone]  F: [phone]   [x] [address]  [facility] 36   [address]  P: [phone]  F: [phone]  [] [address]  [address]  P: [phone]  F: [phone] [] [address]  P: [phone]  F: [phone]  [] [address]  [facility]   [address]   [place]: [phone]  F: [phone]   [] [place] [id number] [facility] [address]  [place]: [phone]   F: [phone]     Physical Therapy Cancel/No Show note    Date: 2023  Patient: Tyler Peralta  : 1980  MRN: [id number]    Cancels/No Shows to date:  0    For today's appointment patient:    [x]  Cancelled    [] Rescheduled appointment    [] No-show     Reason given by patient:    []  Patient ill    []  Conflicting appointment    [] No transportation      [x] Conflict with work- out of town    [] No reason given    [] Weather related    [] COVID-19    [] Other:      Comments:        [x] Next appointment was confirmed    Electronically signed by: Alisa Thao PT

## 2023-01-23 ENCOUNTER — HOSPITAL ENCOUNTER (OUTPATIENT)
Dept: PHYSICAL THERAPY | Facility: CLINIC | Age: 43
Setting detail: THERAPIES SERIES
Discharge: HOME | End: 2023-01-23
Payer: OTHER GOVERNMENT

## 2023-01-23 PROCEDURE — 97110 THERAPEUTIC EXERCISES: CPT

## 2023-01-23 PROCEDURE — 97140 MANUAL THERAPY 1/> REGIONS: CPT

## 2023-01-23 NOTE — FLOWSHEET NOTE
[] [address]  [address]  [phone]  F: [phone] [x] [address]  [facility]   [address]  P: [phone]  F: [phone] [] [address]  [address] [facility]  P: [phone]  F: [phone] [] [address]  P: [phone]  F: [phone] [] [address]  [facility]   [address]   [place]: [phone]  F: [phone]      Physical Therapy Daily Treatment Note    Date:  2023  Patient Name:  Nohemy Ray    :  1980  MRN: [id number]  Physician: Joseph Sosa MD                                   Insurance: [address] [place] [address] (16 visits approved through 2023, goes through a workers comp division of  - [employer] of duty care\")  Medical Diagnosis: spasms of muscle, myofascial pain on left side, trigger point of left shoulder region  Left mid scapular pain, left shoulder pain                     Rehab Codes: M54.6, M62.830, M79.1  Onset Date:  ~ January 15, 2022                   Next 's appt: 2022    Visit# / total visits:     Cancels/No Shows: 2/0    Subjective:    Pain:  [x] Yes  [] No Location: left medial/inferior scapular border       Pain Rating: (0-10 scale) 3/10   Pain altered Tx:     [] No  [x] Yes  Action:continuedMFR to mid scapular region and P-A mobs to T7-T12 region - prone  Comments: Pt reports feeling better than last Thursday. Unsure if taping better or not    Objective:  Modalities:    Prone on table,  scapular strengthening  manual MFR to mid scapular region and P-A mobs to T7-T12 region - prone  Active exercises  HP to thoracic and cervical spine/B UT's in supine x10'    5.   Kinesiotape  2 \"I\" strips one to left rhomboid to assist with relaxation, one to right rhomboid (anchor on scapula, 20% tension)    Precautions: blood pressure - running a little high 140/90's at last physician visit, now holding meloxicam - 1/9 reports doing \"pretty good\"  AA-ROM, mild PRE, massage, stretching, HEP per script  Exercises:bolded completed  Exercise Reps/ Time Weight/ Level Comments         Seated       Thoracic rotation 10x 10sec Hands crossed in front   Arms flexion overhead with thoracic extension 10x 5 sec  felt pinching in between shoulder blades 12/29   Upper trap stretch   3x30\"      1/9 performed lying   Levator stretch    3x30\"       Scap retraction   10x       C-Rotation   5xea      Posterior shoulder rolls   10x    audible popping in L shoulder area   Chin tucks   10x   Pt notes pop with every rep that causes min pain and is annoying 12/22   Lower cervical and upper thoracic stretch   (Clasp) hands in front   5x10\"            Side lying       Thoracic Rotation with Open book  10x ea     Supine      1/2 foam roller, lie, arms overhead, ER of shoulders  2 min                Prone on mat     Added 12/22    rows  20x  2# each hand      extension  20x  2# each hand      HAB  20x  2# each hand           Standing       Doorway pec stretch  5x 10 sec                      Other:    Specific Instructions for next treatment:    Treatment Charges: Mins Units   []  Modalities- hot pack     [x]  Ther Exercise 30 2   [x]  Manual Therapy 20 1   []  Ther Activities     []  Aquatics     []  Vasocompression       Other- kinesiotape     Total Treatment time 50 3       Assessment: [x] Progressing toward goals. Sore yesterday after putting roof on kids playhouse, arms up overhead, took a meloxicam and felt better this am.   Patient continues to reports soreness in midscapular region. Thoracic spine with limited mobility, though pt notes improved rotational movement twisting to the right since starting PT.   I continue to educate on the importance of posture especially with sitting in car, traveling for work and avoiding forward flexing posturing. [] No change. [x] Other: (gone first week of February 5-17th)    1- trying to get in to see Dr. Estefani Gonsales  [x] Patient would continue to benefit from skilled physical therapy services in order to: decrease pain and tightness in thoracic spine and upper traps that are limiting simple ADL's at home. To include manual work, Myofasical release and gentle strengthening to work on mobility of thoracic spine and scapula and progress to strengthening to promote scapular retraction and spinal extension in thoracic spine. Problem list, as detailed above:   [x] ? Pain left thoracic, scapular region > right                         [x] ? ROM decreased thoracic spinal mobility                          [x] ? Strength   In left shoulder and shoulder girdle. [x] ? Function: Increased pain in thoracic region with simple tasks with arms stretched forward, washing dishes, etc  [x] Postural Deviations- increased kyphosis with forward head  [x] Other trigger point palpated left inferior medial scapula, left upper trap (to a lesser degree right upper trap)     Pain appears to be originating more in thoracic spine and left periscapular region than in shoulder joint itself. STG: (to be met in 8 treatments)  ? Pain:from 3/10 to 1/10 with completing light home tasks met 1-  ? ROM: Demonstrate sitting and actively extending thoracic spine without increased pain for demonstrate improved mobility of thoracic spine met 1-  ? Strength: Initiate prone scapular strengthening to improve upright posturing (decrease IR of shoulders) 1- met  ? Function: Able to hold 3# (coffee cup, groceries) in front of him for 20 seconds without increased pain in scapula/thoracic spine. 1- met except when times of fatigue/aggravation  Patient to be independent with home exercise program as demonstrated by performance with correct form without cues.  2- progressing     LTG: (to be met in 16 treatments)  Able to complete home cleaning, laundry, vacuuming with little to no difficulty. Able to reach forward holding up to 10# to put groceries away, use tools at home  Strength in left scapula = to right to decrease strain and pain in thoracic paraspinals  No reports of stabbing pain or pins and needles in thoracic spine and scapular to allow him to maintain active lifestyle including working for [employer]                    Patient goals:  be pain free    Pt. Education:  [] Yes  [] No  [x] Reviewed Prior HEP/Ed  Method of Education: [] Verbal   [] Demo new prone ex's  [] Written  Comprehension of Education:  [] Verbalizes understanding. [] Demonstrates understanding. [] Needs review. - postural awareness   [x] Demonstrates/verbalizes HEP/Ed previously given. Access Code: [email or web address]  URL: [email or web address]  Date: 12/08/2022  Prepared by:  Saida Green  Seated Trunk Rotation - Arms Crossed - 2 x daily - 7 x weekly - 1 sets - 10 reps - 10 hold  Seated Thoracic Extension and Rotation with Reach - 2 x daily - 7 x weekly - 1 sets - 10 reps - 5 hold  Date: 12/15/2022  Prepared by: Cheyenne Monson  Sidelying Thoracic Rotation with Open Book - 1 x daily - 7 x weekly - 3 sets - 10 reps  Standing Lower Cervical and Upper Thoracic Stretch - 1 x daily - 7 x weekly - 3 sets - 10 reps  Standing Thoracic Spine Stretch - 1 x daily - 7 x weekly - 3 sets - 10 reps  Standing with Forearms Thoracic Rotation - 1 x daily - 7 x weekly - 3 sets - 10 reps  Seated Cervical Retraction - 1 x daily - 7 x weekly - 3 sets - 10 reps  Doorway Pec Stretch at 90 Degrees Abduction - 1 x daily - 7 x weekly - 3 sets - 10 reps  Seated Upper Trapezius Stretch - 1 x daily - 7 x weekly - 3 sets - 10 reps  Gentle Levator Scapulae Stretch - 1 x daily - 7 x weekly - 3 sets - 10 reps  Seated Scapular Retraction - 1 x daily - 7 x weekly - 3 sets - 10 reps  Seated Shoulder Rolls - 1 x daily - 7 x weekly - 3 sets - 10 reps  Hooklying Shoulder Flexion Extension AROM on Foam Roll - 1 x daily - 7 x weekly - 3 sets - 10 reps       Plan: [x] Continue current frequency toward long and short term goals.    [x] Specific Instructions for subsequent treatments:     To see Dr. Mcknight for further assessment    He is going out of town some for work.    He will call to schedule as needed, also given my private voice mail if wants to leave an update.    No further visits scheduled at his time.       Time In 5:08 pm     Time Out:   6:01 pm    Electronically signed by:  RUPINDER SANTA, PT

## 2023-01-27 ENCOUNTER — OFFICE VISIT (OUTPATIENT)
Dept: ORTHOPEDIC SURGERY | Age: 43
End: 2023-01-27
Payer: OTHER GOVERNMENT

## 2023-01-27 VITALS — HEIGHT: 63 IN | RESPIRATION RATE: 16 BRPM | BODY MASS INDEX: 37.56 KG/M2 | WEIGHT: 212 LBS

## 2023-01-27 DIAGNOSIS — M25.512 LEFT SHOULDER PAIN, UNSPECIFIED CHRONICITY: Primary | ICD-10-CM

## 2023-01-27 PROCEDURE — 99203 OFFICE O/P NEW LOW 30 MIN: CPT | Performed by: ORTHOPAEDIC SURGERY

## 2023-01-27 NOTE — PROGRESS NOTES
Orthopedic Shoulder Encounter Note     Chief complaint: left shoulder pain    HPI: Rhett Hankins is a 38 y.o.  right-hand dominant male who presents for evaluation of his left shoulder. 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 While he had some pain to the time he was not bad but then it got worse a few days later and has been painful ever since. He localizes his pain primarily to the mid back in the mid to upper thoracic spine region. Over time he has developed pain in the scapular region as well which is associated with some crepitation with certain movements. He states that if he tosses and turns a lot at nighttime he wakes up in pain but otherwise has pain with simple activities during the day such as typing on a computer, doing the dishes or doing any overhead work. He describes his pain in the posterior shoulder and mid back region as a burning pain. He was seen by spine surgeon who on assessment also obtained and reviewed MRI studies of his thoracic and cervical spines and has not found anything structurally wrong in these regions. He was subsequently seen by Dr. Ronnie Huynh who was treated him with what sounds like trigger point injections using local anesthetic which the patient states did provide some pain relief but this was short-lived. He has been treated extensively with physical therapy and is currently in physical therapy. Previous treatment:    NSAIDs: Mobic, ibuprofen    Physical Therapy:  Yes    Injections: Trigger point injections administered by Dr. Ronnie Huynh    Surgeries: None    Review of Systems:   Constitutional: Negative for fever, chills, sweats. Pain Score:   3  Neurological: Negative for headache, numbness, or weakness. Musculoskeletal: As noted in HPI     Past Medical History  Vijay Lima  has no past medical history on file.     Past Surgical History  Dayana Berry  has a past surgical history that includes Tonsillectomy and adenoidectomy. Current Medications  Current Outpatient Medications   Medication Sig Dispense Refill    meloxicam (MOBIC) 15 MG tablet TAKE 1 TABLET BY MOUTH EVERY DAY      montelukast (SINGULAIR) 10 MG tablet TAKE 1 TABLET BY MOUTH DAILY 90 tablet 0    Multiple Vitamins-Minerals (MULTIVITAMIN ADULT PO) Take by mouth      Fexofenadine HCl (ALLEGRA ALLERGY PO) Take by mouth       No current facility-administered medications for this visit. Allergies  Allergies have been reviewed. Dayana Berry is allergic to grass extracts [gramineae pollens] and seasonal.    Social History  Dayana Berry  reports that he has never smoked. He has never used smokeless tobacco. He reports current alcohol use. He reports that he does not use drugs. Family History  Gavin's family history is not on file. Physical Exam:     Resp 16   Ht 5' 3\" (1.6 m)   Wt 212 lb (96.2 kg)   BMI 37.55 kg/m²    Constitutional: Patient is oriented to person, place, and time. Patient appears well-developed and well nourished. Mental Status/psychiatric: Behavior is normal. Thought content normal.  HENT: Negative otherwise noted  Head: Normocephalic and Atraumatic  Nose: Normal  Respiratory/Cardio: Effort normal. No respiratory distress. Neck: Normal range of motion Neck supple. Some pain with rotation to the left    Shoulder:    Skin: Skin is warm and dry; no swelling or obvious muscular atrophy.    Vasculature: 2+ radial pulses bilaterally  Neuro: Sensation grossly intact to light touch diffusely  Tenderness: Nontender to palpation    ROM: (Degrees)    Right   A P   Left   A P    Elevation  150    Elevation  150   Abduction  150    Abduction  145    ER   75    ER   75   IR   T12    IR   T12   90 abd/ER      90 abd/ER     90 abd/IR      90 abd/IR     Crepitation  No    Crepitation Yes  Dyskenesia  No    Dyskenesia No      Muscle strength:    Right       Left    Deltoid   5    Deltoid   5  Supraspinatus  5    Supraspinatus  5  ER   5    ER   5  IR   5    IR   5    Special tests    Right   Rotator Cuff    Left    n   Painful arc    n   n   Pain with ER    n    n   Neer's     n    n   Hawkin's    n    n   Drop Arm    n  n   Lift off/Belly Press   n  n   ER Lag    n          AC Joint  n   AC tenderness   n  n   Cross-chest adduction  n       Labrum/biceps    n   Bruin's    n   n   Biceps sheer    n      n   Speed's/Yergason's   n    n   Tenderness Biceps Groove  n    n   Toño's    n         Instability  n   Ant Apprehension   n    n   Post Apprehension   n    n   Ant Load shift    n    n   Post Load shift   n   n   Sulcus     n  n   Generalized Laxity   n  n   Relocation test   n  n   Crank test     n  n   Kam-superior escape  n       Imaging:  Xrays: 4 views of the left shoulder obtained on 1/27/2023 were independently reviewed  Indications: Left shoulder pain  Findings: Normal glenohumeral joint space. Mild osteophytic change involving the distal clavicle at the acromioclavicular joint. Type I acromion. No obvious fracture, dislocation or subluxation. Impression: Left shoulder radiographs with mild degenerative changes at the acromioclavicular joint as outlined above. MRI: MRI of the left shoulder completed on 1/3/2023 was reviewed independently demonstrating intact rotator cuff, biceps, and labrum. No obvious chondral damage. Impression/Plan:     Jaden Wolf is a 38 y.o. old male with left shoulder pain primarily localized to the scapular region. I did have a discussion with the patient today about this. He is noted on exam to have some crepitation at the scapulothoracic interface. This could represent some bursitis at this location but wouldn't fully explain the pains and dysfunction he's experiencing. We had a discussion about possible etiologies, but nothing is apparent at this time to help clarify the picture. We discussed treatment options and I believe the current path that he is on is appropriate i.e. continued physical therapy to work on optimizing normal functioning of the scapular stabilizers. He can continue on with trigger point injections per Dr. Jonny Lopez as needed. No surgical intervention necessary or warranted for this. I will see him back my clinic as needed but he may return or call at anytime with questions or concerns. This note is created with the assistance of a speech recognition program.  While intending to generate adocument that actually reflects the content of the visit, the document can still have some errors including those of syntax and sound a like substitutions which may escape proof reading. It such instances, actual meaningcan be extrapolated by contextual diversion.     NA = Not assessed  RTC = Rotator cuff  RCT = Rotator cuff tear  ER = External rotation  IR = Internal rotation  AC = Acromioclavicular  GH = Glenohumeral  n = No  y = Yes

## 2023-02-01 ENCOUNTER — HOSPITAL ENCOUNTER (OUTPATIENT)
Age: 43
Setting detail: SPECIMEN
Discharge: HOME | End: 2023-02-01

## 2023-02-01 DIAGNOSIS — Z00.00 WELL ADULT EXAM: ICD-10-CM

## 2023-02-01 LAB
25(OH)D3 SERPL-MCNC: 18 NG/ML
ABSOLUTE EOS #: 0.18 K/UL (ref 0–0.44)
ABSOLUTE IMMATURE GRANULOCYTE: <0.03 K/UL (ref 0–0.3)
ABSOLUTE LYMPH #: 2.12 K/UL (ref 1.1–3.7)
ABSOLUTE MONO #: 0.49 K/UL (ref 0.1–1.2)
ALBUMIN SERPL-MCNC: 4.6 G/DL (ref 3.5–5.2)
ALBUMIN/GLOBULIN RATIO: 1.8 (ref 1–2.5)
ALP SERPL-CCNC: 83 U/L (ref 40–129)
ALT SERPL-CCNC: 50 U/L (ref 5–41)
ANION GAP SERPL CALCULATED.3IONS-SCNC: 15 MMOL/L (ref 9–17)
AST SERPL-CCNC: 26 U/L
BASOPHILS # BLD: 0 % (ref 0–2)
BASOPHILS ABSOLUTE: <0.03 K/UL (ref 0–0.2)
BILIRUB SERPL-MCNC: 0.6 MG/DL (ref 0.3–1.2)
BUN SERPL-MCNC: 17 MG/DL (ref 6–20)
CALCIUM SERPL-MCNC: 9.3 MG/DL (ref 8.6–10.4)
CHLORIDE SERPL-SCNC: 105 MMOL/L (ref 98–107)
CHOLEST SERPL-MCNC: 177 MG/DL
CHOLESTEROL/HDL RATIO: 5.7
CO2 SERPL-SCNC: 22 MMOL/L (ref 20–31)
CREAT SERPL-MCNC: 0.78 MG/DL (ref 0.7–1.2)
CRP SERPL HS-MCNC: <3 MG/L (ref 0–5)
EOSINOPHILS RELATIVE PERCENT: 3 % (ref 1–4)
EST. AVERAGE GLUCOSE BLD GHB EST-MCNC: 88 MG/DL
GFR SERPL CREATININE-BSD FRML MDRD: >60 ML/MIN/1.73M2
GLUCOSE SERPL-MCNC: 91 MG/DL (ref 70–99)
HBA1C MFR BLD: 4.7 % (ref 4–6)
HCT VFR BLD AUTO: 44.2 % (ref 40.7–50.3)
HDLC SERPL-MCNC: 31 MG/DL
HGB BLD-MCNC: 14.7 G/DL (ref 13–17)
IMMATURE GRANULOCYTES: 0 %
LDLC SERPL CALC-MCNC: 113 MG/DL (ref 0–130)
LYMPHOCYTES # BLD: 32 % (ref 24–43)
MCH RBC QN AUTO: 27.8 PG (ref 25.2–33.5)
MCHC RBC AUTO-ENTMCNC: 33.3 G/DL (ref 28.4–34.8)
MCV RBC AUTO: 83.6 FL (ref 82.6–102.9)
MONOCYTES # BLD: 7 % (ref 3–12)
NRBC AUTOMATED: 0 PER 100 WBC
PDW BLD-RTO: 12.7 % (ref 11.8–14.4)
PLATELET # BLD AUTO: 197 K/UL (ref 138–453)
PMV BLD AUTO: 9.5 FL (ref 8.1–13.5)
POTASSIUM SERPL-SCNC: 4.2 MMOL/L (ref 3.7–5.3)
PROT SERPL-MCNC: 7.2 G/DL (ref 6.4–8.3)
RBC # BLD: 5.29 M/UL (ref 4.21–5.77)
SEG NEUTROPHILS: 58 % (ref 36–65)
SEGMENTED NEUTROPHILS ABSOLUTE COUNT: 3.88 K/UL (ref 1.5–8.1)
SODIUM SERPL-SCNC: 142 MMOL/L (ref 135–144)
TRIGL SERPL-MCNC: 165 MG/DL
VIT B12 SERPL-MCNC: 1088 PG/ML (ref 232–1245)
WBC # BLD AUTO: 6.7 K/UL (ref 3.5–11.3)

## 2023-03-16 ENCOUNTER — OFFICE VISIT (OUTPATIENT)
Dept: PHYSICAL MEDICINE AND REHAB | Age: 43
End: 2023-03-16

## 2023-03-16 VITALS
SYSTOLIC BLOOD PRESSURE: 128 MMHG | HEART RATE: 96 BPM | WEIGHT: 211 LBS | DIASTOLIC BLOOD PRESSURE: 100 MMHG | TEMPERATURE: 98 F | BODY MASS INDEX: 37.39 KG/M2 | HEIGHT: 63 IN

## 2023-03-16 DIAGNOSIS — M89.8X1 CHRONIC PAIN OF SCAPULA: ICD-10-CM

## 2023-03-16 DIAGNOSIS — M62.838 SPASM OF MUSCLE: Primary | ICD-10-CM

## 2023-03-16 DIAGNOSIS — M25.512 TRIGGER POINT OF LEFT SHOULDER REGION: ICD-10-CM

## 2023-03-16 DIAGNOSIS — M54.9 MID BACK PAIN: ICD-10-CM

## 2023-03-16 DIAGNOSIS — G89.29 CHRONIC PAIN OF SCAPULA: ICD-10-CM

## 2023-03-16 DIAGNOSIS — M79.18 MYOFASCIAL PAIN ON LEFT SIDE: ICD-10-CM

## 2023-03-16 NOTE — LETTER
[address] [facility] PHYSICAL MEDICINE AND REHABILITATION  [facility] [place] [address]  Dept: [phone]  Dept Fax: [phone]      3/16/23    Patient: Ti Turcios  YOB: 1980    Dear  Louis Victor MD ,    I had the pleasure of seeing your patient, Ti Turcios today in the office today. Below are the relevant portions of my assessment and plan of care. IMPRESSION:   Diagnosis Orders   1. Spasm of muscle  External Referral To Orthopedic Surgery      2. Trigger point of left shoulder region        3. Mid back pain  External Referral To Orthopedic Surgery      4. Chronic pain of scapula        5. 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 He has seen Dr. Jailyn Rios of neurosurgery-per Dr Jailyn Rios cervical and thoracic MRI is unremarkable, flexion-extension cervical spine unremarkable per Dr. Jailyn Rios. X-ray of the left shoulder show some degenerative changes. He has seen Ortho Dr. Marie Tovar, has had physical therapy and trigger point injections. 1.  Continues with discomfort mid thoracic region. We discussed consideration of Neurontin though his liver enzymes are slightly increasing (ALT- ( 29-41-50) -he plans to talk to his PCP monitor liver enzymes and if okay consider low-dose Neurontin 100 mg 2-3 times a day and   recheck liver enzymes. Advised to minimize Flexeril and avoid anti-inflammatories due to blood pressure, CRP nl 2/1  2. Due to continued discomfort patient request second opinion with Dr. Jacques Lewis and referred. Would recommend evaluation and consideration of possible chiropractic treatment and/or pain management. 3. Also noted decrease Vit D - 18- rec supplementation and f/u PCP  4.   In regards to work-he request evaluation ability to lift ,push ,pull ,carry 100 pounds, etc and Ability to work with firearms, carry 40 pound backpack at this point considering his symptoms unknown etiology continued mid back pain aggravated with activity and lifting would recommend no work until follow-up with Dr. Wero Harrison and further recommendations at that time  5  Plan to follow-up in approximately 2 months or earlier if necessary, cont f/u with PCP re: HTN. Orders Placed This Encounter   Procedures    External Referral To Orthopedic Surgery     Referral Priority:   Routine     Referral Type:   Consult for Advice and Opinion     Referral Reason:   Specialty Services Required     Referred to Provider:   Austyn Hudson MD     Requested Specialty:   Orthopedic Surgery     Number of Visits Requested:   1     No orders of the defined types were placed in this encounter. No follow-ups on file. Thank you for allowing me to participate in the care of this patient. I will keep you updated on this patient's follow up and I look forward to serving you and your patients again in the future. Amilcar Kan. Sergio Zarate MD            2023      No referring provider defined for this encounter. Patient: Lainey Westfall   MR Number: [id number]   YOB: 1980   Date of Visit: 3/16/2023       Dear Dr. Arline Nash ref. provider found: Thank you for referring Lissette Diehl to me for evaluation/treatment. Below are the relevant portions of my assessment and plan of care. Diagnosis Orders   1. Spasm of muscle  External Referral To Orthopedic Surgery      2. Trigger point of left shoulder region        3. Mid back pain  External Referral To Orthopedic Surgery      4. Chronic pain of scapula        5. 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   .  He has seen Dr. Magdalena Velasquez of neurosurgery-per Dr Cheyanne Mckinney cervical and thoracic MRI is unremarkable, flexion-extension cervical spine unremarkable per Dr. Cheyanne Mckinney. X-ray of the left shoulder show some degenerative changes. He has seen Ortho Dr. Alfa Jensen, has had physical therapy and trigger point injections. 1.  Continues with discomfort mid thoracic region. We discussed consideration of Neurontin though his liver enzymes are slightly increasing (ALT- ( 29-41-50) -he plans to talk to his PCP monitor liver enzymes and if okay consider low-dose Neurontin 100 mg 2-3 times a day and recheck liver enzymes. Advised to minimize Flexeril and avoid anti-inflammatories due to blood pressure, CRP nl 2/1  2. Due to continued discomfort patient request second opinion with Dr. Abbey Petersen and referred. .  Would recommend evaluation and consideration of possible chiropractic treatment and/or pain management. 3. Also noted decrease Vit D - 18- rec supplementation and f/u PCP  4. In regards to work-he request evaluation ability to lift ,push ,pull ,carry 100 pounds, etc and Ability to work with firearms, carry 40 pound backpack at this point considering his symptoms unknown etiology continued mid back pain aggravated with activity and lifting would recommend no work until follow-up with Dr. Selena Rodríguez and further recommendations at that time  5  Plan to follow-up in approximately 2 months or earlier if necessary, cont f/u with PCP re: HTN. Orders Placed This Encounter   Procedures    External Referral To Orthopedic Surgery     Referral Priority:   Routine     Referral Type:   Consult for Advice and Opinion     Referral Reason:   Specialty Services Required     Referred to Provider:   Camryn Pearce MD     Requested Specialty:   Orthopedic Surgery     Number of Visits Requested:   1     No orders of the defined types were placed in this encounter. No follow-ups on file.         If you have questions, please do not hesitate to call me. I look forward to following Michelle Mukherjee along with you. Sincerely,        Oralia Fischer. Edmond Mujica MD    CC providers:  Nikita Ricks [phone]  [facility]

## 2023-03-16 NOTE — PROGRESS NOTES
[facility] [address] [facility] PHYSICAL MEDICINE AND REHABILITATION  [address] [place] [id number]  Dept: [phone]  Dept Fax: [phone]    New Patient ConsultationNote     Jaun Clark, 38 y.o., male, is c/o of Pain   and request for evaluation of by Abbey Oconnor MD.    HPI:      HPI    Patient returns for follow-up last seen late December 2022. Since that he is followed or orthopedics Dr. Emir Allen MRI from 1/3/2023 demonstrated intact rotator cuff, biceps and labrum no obvious chondral damage. Karimewillian Bradley He is noted to have some crepitus of the Scapulothoracic interface possible bursitis but does not explain his symptoms. Recommend continue physical therapy trigger points if needed. No surgical intervention. Follow-up as needed. As noted he is also seen neurosurgery-Dr. Benuel Angelucci who reviewed cervical and thoracic spine. He has had physical therapy and trigger point injections. He continues with mid thoracic discomfort on activity occasional numbness and tingling in a band area in the mid back. Shoulder pain is somewhat improved. He has not taking his Flexeril or anti-inflammatories. He is following his family doctor for blood pressure issues. He denies any change in his pain pattern. He continues pain in the mid back as above aggravated by activity he states lifting his left leg he occasionally feels a pop in the mid back. Previously had noted left shoulder discomfort, popping, questional weakness. Per Dr Mukesh Palomares: 4 views of the left shoulder obtained on 1/27/2023 were independently reviewed  Indications: Left shoulder pain  Findings: Normal glenohumeral joint space. Mild osteophytic change involving the distal clavicle at the acromioclavicular joint. Type I acromion. No obvious fracture, dislocation or subluxation.   Impression: Left shoulder radiographs with mild degenerative changes at the acromioclavicular joint as outlined above. Per Dr Gisele Looney  MRI: MRI of the left shoulder completed on 1/3/2023 was reviewed independently demonstrating intact rotator cuff, biceps, and labrum. No obvious chondral damage. MRI Left shoulder 1/3/23  Mild supraspinatus tendinosis. No rotator cuff tear is identified. Mild-to-moderate AC joint degenerative changes. Small volume   subacromial-subdeltoid bursal fluid. Xray Left shoulder 1/27/23  Findings: Normal glenohumeral joint space. Mild osteophytic change    involving the distal clavicle at the acromioclavicular joint. Type I    acromion. No obvious fracture, dislocation or subluxation. Impression: Left shoulder radiographs with mild degenerative changes at    the acromioclavicular joint as outlined above       6/22/2022 MRI thoracic spine  IMPRESSION:  Normal plain MRI examination of the thoracic spine. 4/19/2022 x-ray thoracic spine\  IMPRESSION:     No acute osseous abnormality of the thoracic spine. 7/27/2022 x-ray cervical spine  IMPRESSION:   1. Normal alignment of the cervical spine without subluxation on the flexion and extension views. History  57-year-old right-handed male works for the [employer]. 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 He was poorly off multiple trays and he felt a pop in his mid upper back. He was sent to his PCP. He has had x-rays and MRIs of both the cervical and thoracic spine as well as physical therapy. He has seen Dr. Sonal Miguel of neurosurgery. Notes that MRI of the cervical and thoracic spine were normal.  He has had physical therapy and has question some referred pain and shoulder discomfort. As result patient has been referred for further evaluation. Per Dr. Sonal Miguel reviewed MRI cervical spine does not show any evidence of abnormality that explain his symptoms.   .  This is consistent with the findings on thoracic MRI as well.,  He reviewed cervical flexion-extension spine films which did not show any abnormalities. Keo Mcneal He reviewed shoulder x-ray that was ordered by another provider the left shoulder was read as showing evidence of degenerative changes. X-ray left shoulder 7/28/2022 degenerative changes without acute ossific abnormality    MRI cervical spine 8/9/2022-cervical vertebral body and disc space heights and alignment are not and signal are normal, craniocervical junction is normal, signal in the cervical spinal cord is normal, no disc herniation or spinal stenosis seen, no acute abnormality    No past medical history on file. Past Surgical History:   Procedure Laterality Date    TONSILLECTOMY AND ADENOIDECTOMY         No family history on file. Social History     Tobacco Use    Smoking status: Never    Smokeless tobacco: Never   Substance Use Topics    Alcohol use: Yes        Current Outpatient Medications   Medication Sig Dispense Refill    lisinopril (PRINIVIL;ZESTRIL) 10 MG tablet Take 1 tablet by mouth daily 30 tablet 1    meloxicam (MOBIC) 15 MG tablet TAKE 1 TABLET BY MOUTH EVERY DAY      montelukast (SINGULAIR) 10 MG tablet TAKE 1 TABLET BY MOUTH DAILY 90 tablet 0    Multiple Vitamins-Minerals (MULTIVITAMIN ADULT PO) Take by mouth      Fexofenadine HCl (ALLEGRA ALLERGY PO) Take by mouth       No current facility-administered medications for this visit. Allergies   Allergen Reactions    Grass Extracts [Gramineae Pollens]      Other reaction(s): Burning sensation of nose    Seasonal      Other reaction(s): Burning sensation of nose         Subjective:      Review of Systems  CONSTITUTIONAL: Negativefor fever, chills, sweat, fatigue and unexpected weight change.    HEENT:  Negative for diplopia, blindspots, blurred vision, hearing loss, trouble chewing or swallowing, denies coughingwhile eating or drinking denies nosebleed    RESPIRATORY: Negativefor wheezing, coughing or shortness of breath    CARDIOVASCULAR: Negative for chest pain, palpitations,lightheadedness  GASTROINTESTINAL: Negative for heartburn, nausea, constipation,diarrhea, abdominal pain  or incontinence  GENTIOURNIARY: Negative for dysuria, urgency, frequency, incontinence and difficultyurinating. ENDOCRINE: Negative forhot or cold intolerance, denies any significant weight change  MUSCULOSKELETAL:Negative for focal joint pain, back pain, neck pain and arthralgias. NUEROLOGIIC: Negativefor  numbness, tingling, balance loss, headaches or falls  BEHAVIOR/PSYCY:Denies depression, anxiety, memory loss or insomnia  SKIN: Denies ulcers, rashesor bruises     Objective:     Physical Exam   BP (!) 128/100   Pulse 96   Temp 98 °F (36.7 °C)   Ht 5' 3\" (1.6 m)   Wt 211 lb (95.7 kg)   BMI 37.38 kg/m²     Nursing notes and vitals reviewed    CONSTITUTIONAL: He is oriented to person, place, and time. He appears well-developed and well-nourished. HEENT: Pupils equal reactiveto light, exact motion intact, visual fields are full, no facial asymmetry, speechfluent, no dysarthria, comprehension intact, object naming intact, repetition intact,basic cognition intact  CARDIOVASCULAR:Heart regular rate and rhythm with no murmurs rubs or gallops   PULMONARY/CHEST: Clear to auscultation with no wheezes or crackles noted  ABDOMEN: Soft, nontender with positive bowel sounds, no guarding or rebound   NEUROLOGIC:    Orientation: Alert and oriented×3,    cranial nerves:  II through XII are intact,   Strength:5 out of 5 throughout bilateral upper and lower extremities including shoulder flexion, extension, abduction, adduction, internal and external rotation. Drop arm test is negative.   There is no sulcus sign, there is no atrophy there is no scapular winging, there is no atrophy   Balance: Intact, Romberg's is negative   Deep tendon reflexes: Bilaterally equal and symmetric, negative Babinski, negativeHoffmann's   Provocative maneuvers:  EXTREMITIES: No calf tenderness, negative Homans, negative warmth, pulses are intact  SKIN: Skin is warm and dry. PSYCIATIRIC: normal mood and affect, behavior is normal. Thought content normal.  No trigger point noted today mild questional tenderness over thoracic vertebral muscle    Assessment:       Diagnosis Orders   1. Spasm of muscle  External Referral To Orthopedic Surgery      2. Trigger point of left shoulder region        3. Mid back pain  External Referral To Orthopedic Surgery      4. Chronic pain of scapula        5. Myofascial pain on left side               Plan:      43-year-old male working in [employer] has discomfort at mid horacic area with radiation into the mid scapular and left shoulder after an injury while moving trays. .  He has seen Dr. Rosana Brittle of neurosurgery-per Dr Rosana Brittle cervical and thoracic MRI is unremarkable, flexion-extension cervical spine unremarkable per Dr. Rosana Brittle. X-ray of the left shoulder show some degenerative changes. He has seen Ortho Dr. Fernanda Teresa, has had physical therapy and trigger point injections. 1.  Continues with discomfort mid thoracic region. We discussed consideration of Neurontin though his liver enzymes are slightly increasing (ALT- ( 29-41-50) -he plans to talk to his PCP monitor liver enzymes and if okay consider low-dose Neurontin 100 mg 2-3 times a day and recheck liver enzymes. Advised to minimize Flexeril and avoid anti-inflammatories due to blood pressure, CRP nl 2/1  2. Due to continued discomfort patient request second opinion with Dr. Kalei Brooks and referred. .  Would recommend evaluation and consideration of possible chiropractic treatment and/or pain management. 3. Also noted decrease Vit D - 18- rec supplementation and f/u PCP  4.   In regards to work-he request evaluation ability to lift ,push ,pull ,carry 100 pounds, etc and Ability to work with firearms, carry 40 pound backpack at this point considering his symptoms unknown etiology continued mid back pain aggravated with activity and lifting would recommend no work until follow-up with Dr. Aixa Farrell and further recommendations at that time  5  Plan to follow-up in approximately 2 months or earlier if necessary, cont f/u with PCP re: HTN. 40 min spent review labs, consults, exam, discussion with patient     Orders Placed This Encounter   Procedures    External Referral To Orthopedic Surgery     Referral Priority:   Routine     Referral Type:   Consult for Advice and Opinion     Referral Reason:   Specialty Services Required     Referred to Provider:   Kaylynn Keyes MD     Requested Specialty:   Orthopedic Surgery     Number of Visits Requested:   1     No orders of the defined types were placed in this encounter. No follow-ups on file. Electronically signedby Chano Morrow. Valentina Lema MD on 3/16/2023 at 5:35 PM    Please note that this chart was generated using voicerecognition Dragon dictation software. Although every effort was made to ensurethe accuracy of this automated transcription, some errors in transcription may haveoccurred.

## 2023-05-25 ENCOUNTER — TELEPHONE (OUTPATIENT)
Dept: PHYSICAL MEDICINE AND REHAB | Age: 43
End: 2023-05-25

## 2023-05-30 NOTE — TELEPHONE ENCOUNTER
Brief update: spoke to patient today to see if he'd spoken with Dr. Mateus Mckeon office. He said he had, but was still unsure if he was going to provide a letter. Therefore, he will likely still need a letter from Dr. Edith Escamilla. 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 He will call us with any updates if needed before Dr. Edith Escamilla returns to the office.

## 2023-09-12 ENCOUNTER — OFFICE VISIT (OUTPATIENT)
Dept: PHYSICAL MEDICINE AND REHAB | Age: 43
End: 2023-09-12
Payer: OTHER GOVERNMENT

## 2023-09-12 VITALS
BODY MASS INDEX: 37.02 KG/M2 | DIASTOLIC BLOOD PRESSURE: 99 MMHG | HEART RATE: 83 BPM | WEIGHT: 209 LBS | SYSTOLIC BLOOD PRESSURE: 138 MMHG | TEMPERATURE: 97.1 F

## 2023-09-12 DIAGNOSIS — M25.512 LEFT SHOULDER PAIN, UNSPECIFIED CHRONICITY: ICD-10-CM

## 2023-09-12 DIAGNOSIS — M89.8X1 CHRONIC PAIN OF SCAPULA: ICD-10-CM

## 2023-09-12 DIAGNOSIS — M62.838 SPASM OF MUSCLE: Primary | ICD-10-CM

## 2023-09-12 DIAGNOSIS — M25.512 TRIGGER POINT OF LEFT SHOULDER REGION: ICD-10-CM

## 2023-09-12 DIAGNOSIS — G89.29 CHRONIC PAIN OF SCAPULA: ICD-10-CM

## 2023-09-12 DIAGNOSIS — M54.9 MID BACK PAIN: ICD-10-CM

## 2023-09-12 DIAGNOSIS — M79.18 MYOFASCIAL PAIN ON LEFT SIDE: ICD-10-CM

## 2023-09-12 PROCEDURE — 99213 OFFICE O/P EST LOW 20 MIN: CPT | Performed by: PHYSICAL MEDICINE & REHABILITATION

## 2023-09-12 NOTE — PROGRESS NOTES
cranial nerves:  II through XII are intact,   Strength:5 out of 5 throughout bilateral upper and lower extremities including shoulder flexion, extension, abduction, adduction, internal and external rotation. Drop arm test is negative. There is no sulcus sign, there is no atrophy there is no scapular winging, there is no atrophy   Balance: Intact, Romberg's is negative   Deep tendon reflexes: Bilaterally equal and symmetric, negative Babinski, negativeHoffmann's   Provocative maneuvers:  EXTREMITIES: No calf tenderness, negative Homans, negative warmth, pulses are intact  SKIN: Skin is warm and dry. PSYCIATIRIC: normal mood and affect, behavior is normal. Thought content normal.  No trigger point noted today mild questional tenderness over thoracic vertebral muscle    Assessment:       Diagnosis Orders   1. Spasm of muscle        2. Trigger point of left shoulder region        3. Mid back pain        4. Chronic pain of scapula        5. Myofascial pain on left side        6. 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 .  He has seen Dr. Joel Keller of neurosurgery-per Dr Joel Keller cervical and thoracic MRI is unremarkable, flexion-extension cervical spine unremarkable per Dr. Joel Keller. X-ray of the left shoulder show some degenerative changes. He has seen Ortho Dr. Drummond More, has had physical therapy and trigger point injections. Currently seeing orthopedic spine Dr. Faheem Tam and pain management    1. Continues with discomfort mid thoracic region. Had discussed pain medication adjustments i.e. Neurontin. However held off due to mild elevated LFTs. He discussed with his primary care physician-tried Cymbalta which he did not tolerate. He does not want to try other medications.   Advised to be careful with

## 2023-12-29 ENCOUNTER — HOSPITAL ENCOUNTER (OUTPATIENT)
Dept: MRI IMAGING | Facility: CLINIC | Age: 43
Discharge: HOME | End: 2023-12-29
Payer: OTHER GOVERNMENT

## 2023-12-29 ENCOUNTER — HOSPITAL ENCOUNTER (OUTPATIENT)
Dept: MRI IMAGING | Facility: CLINIC | Age: 43
End: 2023-12-29
Payer: OTHER GOVERNMENT

## 2023-12-29 DIAGNOSIS — S29.012A STRAIN OF THORACIC SPINE: ICD-10-CM

## 2023-12-29 PROCEDURE — 72141 MRI NECK SPINE W/O DYE: CPT

## 2023-12-29 PROCEDURE — 72146 MRI CHEST SPINE W/O DYE: CPT
